# Patient Record
Sex: FEMALE | Race: BLACK OR AFRICAN AMERICAN | Employment: OTHER | URBAN - METROPOLITAN AREA
[De-identification: names, ages, dates, MRNs, and addresses within clinical notes are randomized per-mention and may not be internally consistent; named-entity substitution may affect disease eponyms.]

---

## 2021-01-01 ENCOUNTER — APPOINTMENT (OUTPATIENT)
Dept: GENERAL RADIOLOGY | Age: 71
DRG: 296 | End: 2021-01-01
Attending: INTERNAL MEDICINE
Payer: MEDICARE

## 2021-01-01 ENCOUNTER — APPOINTMENT (OUTPATIENT)
Dept: CT IMAGING | Age: 71
DRG: 296 | End: 2021-01-01
Attending: STUDENT IN AN ORGANIZED HEALTH CARE EDUCATION/TRAINING PROGRAM
Payer: MEDICARE

## 2021-01-01 ENCOUNTER — APPOINTMENT (OUTPATIENT)
Dept: GENERAL RADIOLOGY | Age: 71
DRG: 296 | End: 2021-01-01
Attending: STUDENT IN AN ORGANIZED HEALTH CARE EDUCATION/TRAINING PROGRAM
Payer: MEDICARE

## 2021-01-01 ENCOUNTER — APPOINTMENT (OUTPATIENT)
Dept: NON INVASIVE DIAGNOSTICS | Age: 71
DRG: 296 | End: 2021-01-01
Attending: STUDENT IN AN ORGANIZED HEALTH CARE EDUCATION/TRAINING PROGRAM
Payer: MEDICARE

## 2021-01-01 ENCOUNTER — HOSPITAL ENCOUNTER (INPATIENT)
Age: 71
LOS: 3 days | DRG: 296 | End: 2021-12-19
Attending: STUDENT IN AN ORGANIZED HEALTH CARE EDUCATION/TRAINING PROGRAM | Admitting: HOSPITALIST
Payer: MEDICARE

## 2021-01-01 VITALS
SYSTOLIC BLOOD PRESSURE: 83 MMHG | WEIGHT: 189.38 LBS | TEMPERATURE: 98.3 F | BODY MASS INDEX: 34.85 KG/M2 | OXYGEN SATURATION: 100 % | DIASTOLIC BLOOD PRESSURE: 67 MMHG | HEIGHT: 62 IN | RESPIRATION RATE: 7 BRPM

## 2021-01-01 DIAGNOSIS — I46.9 CARDIAC ARREST (HCC): Primary | ICD-10-CM

## 2021-01-01 DIAGNOSIS — J96.01 ACUTE RESPIRATORY FAILURE WITH HYPOXIA (HCC): ICD-10-CM

## 2021-01-01 DIAGNOSIS — R57.9 SHOCK (HCC): ICD-10-CM

## 2021-01-01 DIAGNOSIS — C50.919 MALIGNANT NEOPLASM OF FEMALE BREAST, UNSPECIFIED ESTROGEN RECEPTOR STATUS, UNSPECIFIED LATERALITY, UNSPECIFIED SITE OF BREAST (HCC): ICD-10-CM

## 2021-01-01 DIAGNOSIS — Z71.89 GOALS OF CARE, COUNSELING/DISCUSSION: ICD-10-CM

## 2021-01-01 LAB
ABO + RH BLD: NORMAL
ALBUMIN SERPL-MCNC: 2.3 G/DL (ref 3.4–5)
ALBUMIN SERPL-MCNC: 2.7 G/DL (ref 3.4–5)
ALBUMIN SERPL-MCNC: 2.8 G/DL (ref 3.4–5)
ALBUMIN SERPL-MCNC: 3.6 G/DL (ref 3.4–5)
ALBUMIN SERPL-MCNC: 4.1 G/DL (ref 3.4–5)
ALBUMIN/GLOB SERPL: 0.7 {RATIO} (ref 0.8–1.7)
ALP SERPL-CCNC: 135 U/L (ref 45–117)
ALT SERPL-CCNC: 89 U/L (ref 13–56)
ANION GAP BLD CALC-SCNC: 13 MMOL/L (ref 10–20)
ANION GAP BLD CALC-SCNC: 20 MMOL/L (ref 10–20)
ANION GAP BLD CALC-SCNC: 24 MMOL/L (ref 10–20)
ANION GAP SERPL CALC-SCNC: 18 MMOL/L (ref 3–18)
ANION GAP SERPL CALC-SCNC: 19 MMOL/L (ref 3–18)
ANION GAP SERPL CALC-SCNC: 24 MMOL/L (ref 3–18)
ANION GAP SERPL CALC-SCNC: 25 MMOL/L (ref 3–18)
ANION GAP SERPL CALC-SCNC: 32 MMOL/L (ref 3–18)
APPEARANCE UR: CLEAR
ARTERIAL PATENCY WRIST A: POSITIVE
AST SERPL-CCNC: 231 U/L (ref 10–38)
BACTERIA SPEC CULT: ABNORMAL
BACTERIA SPEC CULT: ABNORMAL
BACTERIA URNS QL MICRO: ABNORMAL /HPF
BASE DEFICIT BLD-SCNC: 13.9 MMOL/L
BASE DEFICIT BLD-SCNC: 2.3 MMOL/L
BASE DEFICIT BLD-SCNC: 3.2 MMOL/L
BASE DEFICIT BLD-SCNC: 5.9 MMOL/L
BASE DEFICIT BLD-SCNC: 7.1 MMOL/L
BASOPHILS # BLD: 0 K/UL (ref 0–0.1)
BASOPHILS NFR BLD: 0 % (ref 0–2)
BDY SITE: ABNORMAL
BILIRUB SERPL-MCNC: 1.3 MG/DL (ref 0.2–1)
BILIRUB UR QL: NEGATIVE
BLOOD GROUP ANTIBODIES SERPL: NORMAL
BNP SERPL-MCNC: 4672 PG/ML (ref 0–900)
BODY TEMPERATURE: 98.5
BODY TEMPERATURE: 98.6
BUN SERPL-MCNC: 13 MG/DL (ref 7–18)
BUN SERPL-MCNC: 17 MG/DL (ref 7–18)
BUN SERPL-MCNC: 26 MG/DL (ref 7–18)
BUN SERPL-MCNC: 28 MG/DL (ref 7–18)
BUN SERPL-MCNC: 40 MG/DL (ref 7–18)
BUN/CREAT SERPL: 7 (ref 12–20)
BUN/CREAT SERPL: 8 (ref 12–20)
BUN/CREAT SERPL: 9 (ref 12–20)
CA-I BLD-MCNC: 1.11 MMOL/L (ref 1.12–1.32)
CA-I BLD-MCNC: 1.2 MMOL/L (ref 1.12–1.32)
CA-I BLD-MCNC: 1.28 MMOL/L (ref 1.12–1.32)
CALCIUM SERPL-MCNC: 7.5 MG/DL (ref 8.5–10.1)
CALCIUM SERPL-MCNC: 8.6 MG/DL (ref 8.5–10.1)
CALCIUM SERPL-MCNC: 8.9 MG/DL (ref 8.5–10.1)
CALCIUM SERPL-MCNC: 9 MG/DL (ref 8.5–10.1)
CALCIUM SERPL-MCNC: 9.5 MG/DL (ref 8.5–10.1)
CALCULATED R AXIS, ECG10: -78 DEGREES
CALCULATED T AXIS, ECG11: 111 DEGREES
CAOX CRY URNS QL MICRO: ABNORMAL
CHLORIDE BLD-SCNC: 102 MMOL/L (ref 98–107)
CHLORIDE BLD-SCNC: 98 MMOL/L (ref 98–107)
CHLORIDE BLD-SCNC: 99 MMOL/L (ref 98–107)
CHLORIDE SERPL-SCNC: 101 MMOL/L (ref 100–111)
CHLORIDE SERPL-SCNC: 103 MMOL/L (ref 100–111)
CHLORIDE SERPL-SCNC: 91 MMOL/L (ref 100–111)
CHLORIDE SERPL-SCNC: 97 MMOL/L (ref 100–111)
CHLORIDE SERPL-SCNC: 99 MMOL/L (ref 100–111)
CK MB CFR SERPL CALC: 0.9 % (ref 0–4)
CK MB CFR SERPL CALC: 3.2 % (ref 0–4)
CK MB CFR SERPL CALC: 7.2 % (ref 0–4)
CK MB CFR SERPL CALC: 9.1 % (ref 0–4)
CK MB CFR SERPL CALC: ABNORMAL %
CK MB CFR SERPL CALC: ABNORMAL %
CK MB SERPL-MCNC: 105.3 NG/ML (ref 5–25)
CK MB SERPL-MCNC: 26.8 NG/ML (ref 5–25)
CK MB SERPL-MCNC: 30.8 NG/ML (ref 5–25)
CK MB SERPL-MCNC: 6.5 NG/ML (ref 5–25)
CK MB SERPL-MCNC: ABNORMAL NG/ML (ref 5–25)
CK MB SERPL-MCNC: ABNORMAL NG/ML (ref 5–25)
CK SERPL-CCNC: 1457 U/L (ref 26–192)
CK SERPL-CCNC: 293 U/L (ref 26–192)
CK SERPL-CCNC: 728 U/L (ref 26–192)
CK SERPL-CCNC: 975 U/L (ref 26–192)
CK SERPL-CCNC: ABNORMAL U/L
CK SERPL-CCNC: ABNORMAL U/L
CO2 BLD-SCNC: 21 MMOL/L (ref 19–24)
CO2 BLD-SCNC: 28 MMOL/L (ref 19–24)
CO2 BLD-SCNC: 31 MMOL/L (ref 19–24)
CO2 SERPL-SCNC: 14 MMOL/L (ref 21–32)
CO2 SERPL-SCNC: 18 MMOL/L (ref 21–32)
CO2 SERPL-SCNC: 19 MMOL/L (ref 21–32)
CO2 SERPL-SCNC: 24 MMOL/L (ref 21–32)
CO2 SERPL-SCNC: 25 MMOL/L (ref 21–32)
COLOR UR: ABNORMAL
COVID-19 RAPID TEST, COVR: NOT DETECTED
CREAT BLD-MCNC: 1.27 MG/DL (ref 0.6–1.3)
CREAT BLD-MCNC: 1.27 MG/DL (ref 0.6–1.3)
CREAT BLD-MCNC: 1.52 MG/DL (ref 0.6–1.3)
CREAT SERPL-MCNC: 1.55 MG/DL (ref 0.6–1.3)
CREAT SERPL-MCNC: 1.89 MG/DL (ref 0.6–1.3)
CREAT SERPL-MCNC: 2.94 MG/DL (ref 0.6–1.3)
CREAT SERPL-MCNC: 3.21 MG/DL (ref 0.6–1.3)
CREAT SERPL-MCNC: 5.59 MG/DL (ref 0.6–1.3)
DIAGNOSIS, 93000: NORMAL
DIFFERENTIAL METHOD BLD: ABNORMAL
ECHO AO ROOT DIAM: 2.7 CM
ECHO AO ROOT INDEX: 1.52 CM/M2
ECHO EST RA PRESSURE: 3 MMHG
ECHO LA DIAMETER INDEX: 1.91 CM/M2
ECHO LA DIAMETER: 3.4 CM
ECHO LA TO AORTIC ROOT RATIO: 1.26
ECHO LA VOL 2C: 63 ML (ref 22–52)
ECHO LA VOL 4C: 38 ML (ref 22–52)
ECHO LA VOL BP: 51 ML (ref 22–52)
ECHO LA VOL/BSA BIPLANE: 29 ML/M2 (ref 16–28)
ECHO LA VOLUME AREA LENGTH: 55 ML
ECHO LA VOLUME INDEX A2C: 35 ML/M2 (ref 16–28)
ECHO LA VOLUME INDEX A4C: 21 ML/M2 (ref 16–28)
ECHO LA VOLUME INDEX AREA LENGTH: 31 ML/M2
ECHO LV E' LATERAL VELOCITY: 5 CM/S
ECHO LV E' SEPTAL VELOCITY: 5 CM/S
ECHO LV EDV A2C: 153 ML
ECHO LV EDV A4C: 116 ML
ECHO LV EDV BP: 138 ML (ref 56–104)
ECHO LV EDV INDEX A4C: 65 ML/M2
ECHO LV EDV INDEX BP: 78 ML/M2
ECHO LV EDV NDEX A2C: 86 ML/M2
ECHO LV EJECTION FRACTION A2C: 17 %
ECHO LV EJECTION FRACTION A4C: 27 %
ECHO LV EJECTION FRACTION BIPLANE: 23 % (ref 55–100)
ECHO LV ESV A2C: 127 ML
ECHO LV ESV A4C: 85 ML
ECHO LV ESV BP: 106 ML (ref 19–49)
ECHO LV ESV INDEX A2C: 71 ML/M2
ECHO LV ESV INDEX A4C: 48 ML/M2
ECHO LV ESV INDEX BP: 60 ML/M2
ECHO LV FRACTIONAL SHORTENING: 9 % (ref 28–44)
ECHO LV INTERNAL DIMENSION DIASTOLE INDEX: 3.2 CM/M2
ECHO LV INTERNAL DIMENSION DIASTOLIC: 5.7 CM (ref 3.9–5.3)
ECHO LV INTERNAL DIMENSION SYSTOLIC INDEX: 2.92 CM/M2
ECHO LV INTERNAL DIMENSION SYSTOLIC: 5.2 CM
ECHO LV IVSD: 0.7 CM (ref 0.6–0.9)
ECHO LV MASS 2D: 144.3 G (ref 67–162)
ECHO LV MASS INDEX 2D: 81.1 G/M2 (ref 43–95)
ECHO LV POSTERIOR WALL DIASTOLIC: 0.7 CM (ref 0.6–0.9)
ECHO LV RELATIVE WALL THICKNESS RATIO: 0.25
ECHO LVOT AREA: 3.1 CM2
ECHO LVOT DIAM: 2 CM
ECHO MV E VELOCITY: 0.91 M/S
ECHO MV E/E' LATERAL: 18.2
ECHO MV E/E' RATIO (AVERAGED): 18.2
ECHO MV E/E' SEPTAL: 18.2
ECHO PULMONARY ARTERY END DIASTOLIC PRESSURE: 20 MMHG
ECHO PV REGURGITANT MAX VELOCITY: 2.2 M/S
ECHO RIGHT VENTRICULAR SYSTOLIC PRESSURE (RVSP): 33 MMHG
ECHO RV FREE WALL PEAK S': 9 CM/S
ECHO RV INTERNAL DIMENSION: 3.5 CM
ECHO RV TAPSE: 1.1 CM (ref 1.5–2)
ECHO TV REGURGITANT MAX VELOCITY: 2.73 M/S
ECHO TV REGURGITANT PEAK GRADIENT: 30 MMHG
EOSINOPHIL # BLD: 0 K/UL (ref 0–0.4)
EOSINOPHIL # BLD: 0.1 K/UL (ref 0–0.4)
EOSINOPHIL NFR BLD: 0 % (ref 0–5)
EOSINOPHIL NFR BLD: 2 % (ref 0–5)
EPITH CASTS URNS QL MICRO: ABNORMAL /LPF (ref 0–5)
ERYTHROCYTE [DISTWIDTH] IN BLOOD BY AUTOMATED COUNT: 19.4 % (ref 11.6–14.5)
ERYTHROCYTE [DISTWIDTH] IN BLOOD BY AUTOMATED COUNT: 19.4 % (ref 11.6–14.5)
ERYTHROCYTE [DISTWIDTH] IN BLOOD BY AUTOMATED COUNT: 19.6 % (ref 11.6–14.5)
ERYTHROCYTE [DISTWIDTH] IN BLOOD BY AUTOMATED COUNT: 20.3 % (ref 11.6–14.5)
EST. AVERAGE GLUCOSE BLD GHB EST-MCNC: 105 MG/DL
FIO2 ON VENT: 100 %
GAS FLOW.O2 O2 DELIVERY SYS: ABNORMAL L/MIN
GAS FLOW.O2 SETTING OXYMISER: 27 BPM
GAS FLOW.O2 SETTING OXYMISER: 30 BPM
GLOBULIN SER CALC-MCNC: 3.4 G/DL (ref 2–4)
GLUCOSE BLD STRIP.AUTO-MCNC: 120 MG/DL (ref 70–110)
GLUCOSE BLD STRIP.AUTO-MCNC: 126 MG/DL (ref 70–110)
GLUCOSE BLD STRIP.AUTO-MCNC: 131 MG/DL (ref 70–110)
GLUCOSE BLD STRIP.AUTO-MCNC: 134 MG/DL (ref 70–110)
GLUCOSE BLD STRIP.AUTO-MCNC: 134 MG/DL (ref 70–110)
GLUCOSE BLD STRIP.AUTO-MCNC: 140 MG/DL (ref 70–110)
GLUCOSE BLD STRIP.AUTO-MCNC: 145 MG/DL (ref 70–110)
GLUCOSE BLD STRIP.AUTO-MCNC: 218 MG/DL (ref 70–110)
GLUCOSE BLD STRIP.AUTO-MCNC: 29 MG/DL (ref 70–110)
GLUCOSE BLD STRIP.AUTO-MCNC: 30 MG/DL (ref 70–110)
GLUCOSE BLD STRIP.AUTO-MCNC: 36 MG/DL (ref 70–110)
GLUCOSE BLD STRIP.AUTO-MCNC: 40 MG/DL (ref 70–110)
GLUCOSE BLD STRIP.AUTO-MCNC: 43 MG/DL (ref 70–110)
GLUCOSE BLD STRIP.AUTO-MCNC: 54 MG/DL (ref 70–110)
GLUCOSE BLD STRIP.AUTO-MCNC: 57 MG/DL (ref 70–110)
GLUCOSE BLD STRIP.AUTO-MCNC: 57 MG/DL (ref 70–110)
GLUCOSE BLD STRIP.AUTO-MCNC: 61 MG/DL (ref 70–110)
GLUCOSE BLD STRIP.AUTO-MCNC: 65 MG/DL (ref 70–110)
GLUCOSE BLD STRIP.AUTO-MCNC: 66 MG/DL (ref 70–110)
GLUCOSE BLD STRIP.AUTO-MCNC: 68 MG/DL (ref 70–110)
GLUCOSE BLD STRIP.AUTO-MCNC: 71 MG/DL (ref 70–110)
GLUCOSE BLD STRIP.AUTO-MCNC: 94 MG/DL (ref 70–110)
GLUCOSE BLD STRIP.AUTO-MCNC: 95 MG/DL (ref 70–110)
GLUCOSE BLD-MCNC: 199 MG/DL (ref 65–100)
GLUCOSE BLD-MCNC: 238 MG/DL (ref 65–100)
GLUCOSE BLD-MCNC: 259 MG/DL (ref 65–100)
GLUCOSE SERPL-MCNC: 194 MG/DL (ref 74–99)
GLUCOSE SERPL-MCNC: 533 MG/DL (ref 74–99)
GLUCOSE SERPL-MCNC: 56 MG/DL (ref 74–99)
GLUCOSE SERPL-MCNC: 82 MG/DL (ref 74–99)
GLUCOSE SERPL-MCNC: 98 MG/DL (ref 74–99)
GLUCOSE UR STRIP.AUTO-MCNC: NEGATIVE MG/DL
GRAM STN SPEC: ABNORMAL
HBA1C MFR BLD: 5.3 % (ref 4.2–5.6)
HCO3 BLD-SCNC: 12.6 MMOL/L (ref 22–26)
HCO3 BLD-SCNC: 17.9 MMOL/L (ref 22–26)
HCO3 BLD-SCNC: 19.9 MMOL/L (ref 22–26)
HCO3 BLD-SCNC: 26.3 MMOL/L (ref 22–26)
HCO3 BLD-SCNC: 29.3 MMOL/L (ref 22–26)
HCT VFR BLD AUTO: 28 % (ref 35–45)
HCT VFR BLD AUTO: 32.9 % (ref 35–45)
HCT VFR BLD AUTO: 40.4 % (ref 35–45)
HCT VFR BLD AUTO: 46 % (ref 35–45)
HGB BLD-MCNC: 11.6 G/DL (ref 12–16)
HGB BLD-MCNC: 13.4 G/DL (ref 12–16)
HGB BLD-MCNC: 8.2 G/DL (ref 12–16)
HGB BLD-MCNC: 9.9 G/DL (ref 12–16)
HGB UR QL STRIP: NEGATIVE
IMM GRANULOCYTES # BLD AUTO: 0 K/UL
IMM GRANULOCYTES NFR BLD AUTO: 0 %
INR PPP: 2 (ref 0.8–1.2)
KETONES UR QL STRIP.AUTO: NEGATIVE MG/DL
LACTATE BLD-SCNC: 14.41 MMOL/L (ref 0.4–2)
LACTATE BLD-SCNC: 16.06 MMOL/L (ref 0.4–2)
LACTATE BLD-SCNC: 16.06 MMOL/L (ref 0.4–2)
LEUKOCYTE ESTERASE UR QL STRIP.AUTO: NEGATIVE
LYMPHOCYTES # BLD: 0.7 K/UL (ref 0.9–3.6)
LYMPHOCYTES # BLD: 0.8 K/UL (ref 0.9–3.6)
LYMPHOCYTES # BLD: 1.5 K/UL (ref 0.9–3.6)
LYMPHOCYTES # BLD: 3.5 K/UL (ref 0.9–3.6)
LYMPHOCYTES NFR BLD: 14 % (ref 21–52)
LYMPHOCYTES NFR BLD: 20 % (ref 21–52)
LYMPHOCYTES NFR BLD: 6 % (ref 21–52)
LYMPHOCYTES NFR BLD: 64 % (ref 21–52)
MAGNESIUM SERPL-MCNC: 1.8 MG/DL (ref 1.6–2.6)
MAGNESIUM SERPL-MCNC: 2.1 MG/DL (ref 1.6–2.6)
MAGNESIUM SERPL-MCNC: 2.2 MG/DL (ref 1.6–2.6)
MCH RBC QN AUTO: 29.6 PG (ref 24–34)
MCH RBC QN AUTO: 29.8 PG (ref 24–34)
MCH RBC QN AUTO: 30.7 PG (ref 24–34)
MCH RBC QN AUTO: 30.8 PG (ref 24–34)
MCHC RBC AUTO-ENTMCNC: 28.7 G/DL (ref 31–37)
MCHC RBC AUTO-ENTMCNC: 29.1 G/DL (ref 31–37)
MCHC RBC AUTO-ENTMCNC: 29.3 G/DL (ref 31–37)
MCHC RBC AUTO-ENTMCNC: 30.1 G/DL (ref 31–37)
MCV RBC AUTO: 101.8 FL (ref 78–100)
MCV RBC AUTO: 102.2 FL (ref 78–100)
MCV RBC AUTO: 103.9 FL (ref 78–100)
MCV RBC AUTO: 105.3 FL (ref 78–100)
METAMYELOCYTES NFR BLD MANUAL: 1 %
METAMYELOCYTES NFR BLD MANUAL: 1 %
METAMYELOCYTES NFR BLD MANUAL: 11 %
METAMYELOCYTES NFR BLD MANUAL: 2 %
MONOCYTES # BLD: 0.1 K/UL (ref 0.05–1.2)
MONOCYTES # BLD: 0.2 K/UL (ref 0.05–1.2)
MONOCYTES # BLD: 0.2 K/UL (ref 0.05–1.2)
MONOCYTES # BLD: 0.6 K/UL (ref 0.05–1.2)
MONOCYTES NFR BLD: 2 % (ref 3–10)
MONOCYTES NFR BLD: 3 % (ref 3–10)
MONOCYTES NFR BLD: 3 % (ref 3–10)
MONOCYTES NFR BLD: 5 % (ref 3–10)
MUCOUS THREADS URNS QL MICRO: ABNORMAL /LPF
MYELOCYTES NFR BLD MANUAL: 1 %
MYELOCYTES NFR BLD MANUAL: 2 %
NEUTS BAND NFR BLD MANUAL: 17 % (ref 0–5)
NEUTS BAND NFR BLD MANUAL: 24 % (ref 0–5)
NEUTS BAND NFR BLD MANUAL: 4 % (ref 0–5)
NEUTS BAND NFR BLD MANUAL: 42 % (ref 0–5)
NEUTS SEG # BLD: 1.7 K/UL (ref 1.8–8)
NEUTS SEG # BLD: 4.9 K/UL (ref 1.8–8)
NEUTS SEG # BLD: 5.9 K/UL (ref 1.8–8)
NEUTS SEG # BLD: 9.4 K/UL (ref 1.8–8)
NEUTS SEG NFR BLD: 26 % (ref 40–73)
NEUTS SEG NFR BLD: 39 % (ref 40–73)
NEUTS SEG NFR BLD: 52 % (ref 40–73)
NEUTS SEG NFR BLD: 59 % (ref 40–73)
NITRITE UR QL STRIP.AUTO: NEGATIVE
NRBC # BLD: 0 K/UL (ref 0–0.01)
NRBC # BLD: 0.03 K/UL (ref 0–0.01)
NRBC # BLD: 0.12 K/UL (ref 0–0.01)
NRBC # BLD: 0.26 K/UL (ref 0–0.01)
NRBC BLD-RTO: 0 PER 100 WBC
NRBC BLD-RTO: 0.5 PER 100 WBC
NRBC BLD-RTO: 2 PER 100 WBC
NRBC BLD-RTO: 2.1 PER 100 WBC
O2/TOTAL GAS SETTING VFR VENT: 100 %
O2/TOTAL GAS SETTING VFR VENT: 80 %
PCO2 BLD: 29 MMHG (ref 35–45)
PCO2 BLD: 30.8 MMHG (ref 35–45)
PCO2 BLD: 44.9 MMHG (ref 35–45)
PCO2 BLDV: 65.1 MMHG (ref 41–51)
PCO2 BLDV: 85.2 MMHG (ref 41–51)
PEEP RESPIRATORY: 5 CMH2O
PEEP RESPIRATORY: 5 CMH2O
PEEP RESPIRATORY: 6 CM[H2O]
PH BLD: 7.22 [PH] (ref 7.35–7.45)
PH BLD: 7.26 [PH] (ref 7.35–7.45)
PH BLD: 7.4 [PH] (ref 7.35–7.45)
PH BLDV: 7.14 [PH] (ref 7.32–7.42)
PH BLDV: 7.21 [PH] (ref 7.32–7.42)
PH UR STRIP: 5.5 [PH] (ref 5–8)
PHOSPHATE SERPL-MCNC: 2 MG/DL (ref 2.5–4.9)
PHOSPHATE SERPL-MCNC: 3.6 MG/DL (ref 2.5–4.9)
PHOSPHATE SERPL-MCNC: 4 MG/DL (ref 2.5–4.9)
PHOSPHATE SERPL-MCNC: 7.6 MG/DL (ref 2.5–4.9)
PIP ISTAT,IPIP: 21
PIP ISTAT,IPIP: 23
PLATELET # BLD AUTO: 112 K/UL (ref 135–420)
PLATELET # BLD AUTO: 164 K/UL (ref 135–420)
PLATELET # BLD AUTO: 177 K/UL (ref 135–420)
PLATELET # BLD AUTO: 240 K/UL (ref 135–420)
PLATELET COMMENTS,PCOM: ABNORMAL
PLATELET COMMENTS,PCOM: ABNORMAL
PMV BLD AUTO: 10 FL (ref 9.2–11.8)
PMV BLD AUTO: 10.3 FL (ref 9.2–11.8)
PMV BLD AUTO: 10.4 FL (ref 9.2–11.8)
PMV BLD AUTO: 10.4 FL (ref 9.2–11.8)
PO2 BLD: 144 MMHG (ref 80–100)
PO2 BLD: 350 MMHG (ref 80–100)
PO2 BLD: 378 MMHG (ref 80–100)
PO2 BLDV: 39 MMHG (ref 25–40)
PO2 BLDV: 42 MMHG (ref 25–40)
POTASSIUM BLD-SCNC: 2.4 MMOL/L (ref 3.5–5.1)
POTASSIUM BLD-SCNC: 3.1 MMOL/L (ref 3.5–5.1)
POTASSIUM BLD-SCNC: 3.4 MMOL/L (ref 3.5–5.1)
POTASSIUM SERPL-SCNC: 2.5 MMOL/L (ref 3.5–5.5)
POTASSIUM SERPL-SCNC: 2.6 MMOL/L (ref 3.5–5.5)
POTASSIUM SERPL-SCNC: 2.7 MMOL/L (ref 3.5–5.5)
POTASSIUM SERPL-SCNC: 3.1 MMOL/L (ref 3.5–5.5)
POTASSIUM SERPL-SCNC: 4.6 MMOL/L (ref 3.5–5.5)
POTASSIUM SERPL-SCNC: 5 MMOL/L (ref 3.5–5.5)
PROT SERPL-MCNC: 5.7 G/DL (ref 6.4–8.2)
PROT UR STRIP-MCNC: ABNORMAL MG/DL
PROTHROMBIN TIME: 21.4 SEC (ref 11.5–15.2)
Q-T INTERVAL, ECG07: 562 MS
QRS DURATION, ECG06: 136 MS
QTC CALCULATION (BEZET), ECG08: 575 MS
RBC # BLD AUTO: 2.66 M/UL (ref 4.2–5.3)
RBC # BLD AUTO: 3.22 M/UL (ref 4.2–5.3)
RBC # BLD AUTO: 3.89 M/UL (ref 4.2–5.3)
RBC # BLD AUTO: 4.52 M/UL (ref 4.2–5.3)
RBC #/AREA URNS HPF: ABNORMAL /HPF (ref 0–5)
RBC MORPH BLD: ABNORMAL
SAO2 % BLD: 100 %
SAO2 % BLD: 55 %
SAO2 % BLD: 65 %
SAO2 % BLD: 99.3 % (ref 92–97)
SAO2 % BLD: 99.9 % (ref 92–97)
SERVICE CMNT-IMP: ABNORMAL
SODIUM BLD-SCNC: 143 MMOL/L (ref 136–145)
SODIUM BLD-SCNC: 144 MMOL/L (ref 136–145)
SODIUM BLD-SCNC: 145 MMOL/L (ref 136–145)
SODIUM SERPL-SCNC: 134 MMOL/L (ref 136–145)
SODIUM SERPL-SCNC: 142 MMOL/L (ref 136–145)
SODIUM SERPL-SCNC: 143 MMOL/L (ref 136–145)
SODIUM SERPL-SCNC: 144 MMOL/L (ref 136–145)
SODIUM SERPL-SCNC: 146 MMOL/L (ref 136–145)
SOURCE, COVRS: NORMAL
SP GR UR REFRACTOMETRY: 1.02 (ref 1–1.03)
SPECIMEN EXP DATE BLD: NORMAL
SPECIMEN SITE: ABNORMAL
SPECIMEN TYPE: ABNORMAL
SPECIMEN TYPE: ABNORMAL
TROPONIN-HIGH SENSITIVITY: 6834 NG/L (ref 0–54)
TROPONIN-HIGH SENSITIVITY: ABNORMAL NG/L (ref 0–54)
UROBILINOGEN UR QL STRIP.AUTO: 1 EU/DL (ref 0.2–1)
VANCOMYCIN SERPL-MCNC: 25.3 UG/ML (ref 5–40)
VENTILATION MODE VENT: ABNORMAL
VENTILATION MODE VENT: ABNORMAL
VENTRICULAR RATE, ECG03: 63 BPM
VT SETTING VENT: 400 ML
WBC # BLD AUTO: 12.4 K/UL (ref 4.6–13.2)
WBC # BLD AUTO: 5.5 K/UL (ref 4.6–13.2)
WBC # BLD AUTO: 6 K/UL (ref 4.6–13.2)
WBC # BLD AUTO: 7.7 K/UL (ref 4.6–13.2)
WBC URNS QL MICRO: ABNORMAL /HPF (ref 0–5)

## 2021-01-01 PROCEDURE — 74011000250 HC RX REV CODE- 250: Performed by: FAMILY MEDICINE

## 2021-01-01 PROCEDURE — 82962 GLUCOSE BLOOD TEST: CPT

## 2021-01-01 PROCEDURE — 99285 EMERGENCY DEPT VISIT HI MDM: CPT

## 2021-01-01 PROCEDURE — 71045 X-RAY EXAM CHEST 1 VIEW: CPT

## 2021-01-01 PROCEDURE — 74011250636 HC RX REV CODE- 250/636: Performed by: STUDENT IN AN ORGANIZED HEALTH CARE EDUCATION/TRAINING PROGRAM

## 2021-01-01 PROCEDURE — 96368 THER/DIAG CONCURRENT INF: CPT

## 2021-01-01 PROCEDURE — 74011250636 HC RX REV CODE- 250/636: Performed by: FAMILY MEDICINE

## 2021-01-01 PROCEDURE — 80069 RENAL FUNCTION PANEL: CPT

## 2021-01-01 PROCEDURE — 36415 COLL VENOUS BLD VENIPUNCTURE: CPT

## 2021-01-01 PROCEDURE — 84132 ASSAY OF SERUM POTASSIUM: CPT

## 2021-01-01 PROCEDURE — 77030010538 HC BG FLEXSEAL FMS BMS -A

## 2021-01-01 PROCEDURE — 94762 N-INVAS EAR/PLS OXIMTRY CONT: CPT

## 2021-01-01 PROCEDURE — 74011250636 HC RX REV CODE- 250/636: Performed by: INTERNAL MEDICINE

## 2021-01-01 PROCEDURE — 99223 1ST HOSP IP/OBS HIGH 75: CPT | Performed by: NURSE PRACTITIONER

## 2021-01-01 PROCEDURE — 74011000250 HC RX REV CODE- 250: Performed by: INTERNAL MEDICINE

## 2021-01-01 PROCEDURE — 74011000258 HC RX REV CODE- 258: Performed by: STUDENT IN AN ORGANIZED HEALTH CARE EDUCATION/TRAINING PROGRAM

## 2021-01-01 PROCEDURE — P9047 ALBUMIN (HUMAN), 25%, 50ML: HCPCS | Performed by: FAMILY MEDICINE

## 2021-01-01 PROCEDURE — 85025 COMPLETE CBC W/AUTO DIFF WBC: CPT

## 2021-01-01 PROCEDURE — 93306 TTE W/DOPPLER COMPLETE: CPT

## 2021-01-01 PROCEDURE — C9113 INJ PANTOPRAZOLE SODIUM, VIA: HCPCS | Performed by: INTERNAL MEDICINE

## 2021-01-01 PROCEDURE — 36600 WITHDRAWAL OF ARTERIAL BLOOD: CPT

## 2021-01-01 PROCEDURE — 74011000258 HC RX REV CODE- 258: Performed by: FAMILY MEDICINE

## 2021-01-01 PROCEDURE — 83880 ASSAY OF NATRIURETIC PEPTIDE: CPT

## 2021-01-01 PROCEDURE — 77010033678 HC OXYGEN DAILY

## 2021-01-01 PROCEDURE — 87635 SARS-COV-2 COVID-19 AMP PRB: CPT

## 2021-01-01 PROCEDURE — 51702 INSERT TEMP BLADDER CATH: CPT

## 2021-01-01 PROCEDURE — 74011250637 HC RX REV CODE- 250/637: Performed by: NURSE PRACTITIONER

## 2021-01-01 PROCEDURE — 82040 ASSAY OF SERUM ALBUMIN: CPT

## 2021-01-01 PROCEDURE — 84484 ASSAY OF TROPONIN QUANT: CPT

## 2021-01-01 PROCEDURE — 84295 ASSAY OF SERUM SODIUM: CPT

## 2021-01-01 PROCEDURE — 94002 VENT MGMT INPAT INIT DAY: CPT

## 2021-01-01 PROCEDURE — 74011000258 HC RX REV CODE- 258: Performed by: INTERNAL MEDICINE

## 2021-01-01 PROCEDURE — 74011000250 HC RX REV CODE- 250: Performed by: STUDENT IN AN ORGANIZED HEALTH CARE EDUCATION/TRAINING PROGRAM

## 2021-01-01 PROCEDURE — 84100 ASSAY OF PHOSPHORUS: CPT

## 2021-01-01 PROCEDURE — 87040 BLOOD CULTURE FOR BACTERIA: CPT

## 2021-01-01 PROCEDURE — 65610000006 HC RM INTENSIVE CARE

## 2021-01-01 PROCEDURE — 71275 CT ANGIOGRAPHY CHEST: CPT

## 2021-01-01 PROCEDURE — 80053 COMPREHEN METABOLIC PANEL: CPT

## 2021-01-01 PROCEDURE — 93005 ELECTROCARDIOGRAM TRACING: CPT

## 2021-01-01 PROCEDURE — 82553 CREATINE MB FRACTION: CPT

## 2021-01-01 PROCEDURE — 70450 CT HEAD/BRAIN W/O DYE: CPT

## 2021-01-01 PROCEDURE — 92950 HEART/LUNG RESUSCITATION CPR: CPT

## 2021-01-01 PROCEDURE — 83036 HEMOGLOBIN GLYCOSYLATED A1C: CPT

## 2021-01-01 PROCEDURE — 80202 ASSAY OF VANCOMYCIN: CPT

## 2021-01-01 PROCEDURE — 82803 BLOOD GASES ANY COMBINATION: CPT

## 2021-01-01 PROCEDURE — 5A1945Z RESPIRATORY VENTILATION, 24-96 CONSECUTIVE HOURS: ICD-10-PCS | Performed by: HOSPITALIST

## 2021-01-01 PROCEDURE — 95816 EEG AWAKE AND DROWSY: CPT | Performed by: INTERNAL MEDICINE

## 2021-01-01 PROCEDURE — 76450000000

## 2021-01-01 PROCEDURE — 86901 BLOOD TYPING SEROLOGIC RH(D): CPT

## 2021-01-01 PROCEDURE — 81001 URINALYSIS AUTO W/SCOPE: CPT

## 2021-01-01 PROCEDURE — 83735 ASSAY OF MAGNESIUM: CPT

## 2021-01-01 PROCEDURE — 94003 VENT MGMT INPAT SUBQ DAY: CPT

## 2021-01-01 PROCEDURE — 87070 CULTURE OTHR SPECIMN AEROBIC: CPT

## 2021-01-01 PROCEDURE — 85610 PROTHROMBIN TIME: CPT

## 2021-01-01 PROCEDURE — 74011250636 HC RX REV CODE- 250/636

## 2021-01-01 PROCEDURE — 74177 CT ABD & PELVIS W/CONTRAST: CPT

## 2021-01-01 PROCEDURE — 82550 ASSAY OF CK (CPK): CPT

## 2021-01-01 PROCEDURE — 96365 THER/PROPH/DIAG IV INF INIT: CPT

## 2021-01-01 PROCEDURE — 74011250637 HC RX REV CODE- 250/637: Performed by: FAMILY MEDICINE

## 2021-01-01 PROCEDURE — 0BH18EZ INSERTION OF ENDOTRACHEAL AIRWAY INTO TRACHEA, VIA NATURAL OR ARTIFICIAL OPENING ENDOSCOPIC: ICD-10-PCS | Performed by: HOSPITALIST

## 2021-01-01 PROCEDURE — 74011000636 HC RX REV CODE- 636: Performed by: STUDENT IN AN ORGANIZED HEALTH CARE EDUCATION/TRAINING PROGRAM

## 2021-01-01 PROCEDURE — 77030020291 HC FLEXSEAL FMS BMS -C

## 2021-01-01 RX ORDER — SODIUM BICARBONATE 1 MEQ/ML
SYRINGE (ML) INTRAVENOUS
Status: COMPLETED | OUTPATIENT
Start: 2021-01-01 | End: 2021-01-01

## 2021-01-01 RX ORDER — EPINEPHRINE 0.1 MG/ML
INJECTION INTRACARDIAC; INTRAVENOUS
Status: COMPLETED | OUTPATIENT
Start: 2021-01-01 | End: 2021-01-01

## 2021-01-01 RX ORDER — ALBUMIN HUMAN 250 G/1000ML
25 SOLUTION INTRAVENOUS EVERY 6 HOURS
Status: DISCONTINUED | OUTPATIENT
Start: 2021-01-01 | End: 2021-01-01 | Stop reason: HOSPADM

## 2021-01-01 RX ORDER — DEXTROSE MONOHYDRATE 100 MG/ML
50 INJECTION, SOLUTION INTRAVENOUS CONTINUOUS
Status: DISCONTINUED | OUTPATIENT
Start: 2021-01-01 | End: 2021-01-01 | Stop reason: HOSPADM

## 2021-01-01 RX ORDER — LEVOFLOXACIN 5 MG/ML
750 INJECTION, SOLUTION INTRAVENOUS EVERY 24 HOURS
Status: DISCONTINUED | OUTPATIENT
Start: 2021-01-01 | End: 2021-01-01

## 2021-01-01 RX ORDER — MORPHINE SULFATE 2 MG/ML
2 INJECTION, SOLUTION INTRAMUSCULAR; INTRAVENOUS
Status: DISCONTINUED | OUTPATIENT
Start: 2021-01-01 | End: 2021-01-01 | Stop reason: HOSPADM

## 2021-01-01 RX ORDER — DOPAMINE HYDROCHLORIDE 160 MG/100ML
INJECTION, SOLUTION INTRAVENOUS
Status: COMPLETED
Start: 2021-01-01 | End: 2021-01-01

## 2021-01-01 RX ORDER — NOREPINEPHRINE BITARTRATE/D5W 8 MG/250ML
.5-16 PLASTIC BAG, INJECTION (ML) INTRAVENOUS
Status: DISCONTINUED | OUTPATIENT
Start: 2021-01-01 | End: 2021-01-01 | Stop reason: SDUPTHER

## 2021-01-01 RX ORDER — MORPHINE SULFATE 5 MG/ML
INJECTION, SOLUTION INTRAVENOUS
Status: DISCONTINUED | OUTPATIENT
Start: 2021-01-01 | End: 2021-01-01 | Stop reason: HOSPADM

## 2021-01-01 RX ORDER — DEXTROSE 50 % IN WATER (D50W) INTRAVENOUS SYRINGE
Status: DISPENSED
Start: 2021-01-01 | End: 2021-01-01

## 2021-01-01 RX ORDER — SODIUM CHLORIDE 9 MG/ML
25 INJECTION, SOLUTION INTRAVENOUS CONTINUOUS
Status: DISCONTINUED | OUTPATIENT
Start: 2021-01-01 | End: 2021-01-01 | Stop reason: HOSPADM

## 2021-01-01 RX ORDER — MAGNESIUM SULFATE 100 %
4 CRYSTALS MISCELLANEOUS AS NEEDED
Status: DISCONTINUED | OUTPATIENT
Start: 2021-01-01 | End: 2021-01-01 | Stop reason: HOSPADM

## 2021-01-01 RX ORDER — SODIUM CHLORIDE 0.9 % (FLUSH) 0.9 %
5-10 SYRINGE (ML) INJECTION AS NEEDED
Status: DISCONTINUED | OUTPATIENT
Start: 2021-01-01 | End: 2021-01-01 | Stop reason: HOSPADM

## 2021-01-01 RX ORDER — DOPAMINE HYDROCHLORIDE 160 MG/100ML
0-20 INJECTION, SOLUTION INTRAVENOUS
Status: DISCONTINUED | OUTPATIENT
Start: 2021-01-01 | End: 2021-01-01 | Stop reason: HOSPADM

## 2021-01-01 RX ORDER — NOREPINEPHRINE BITARTRATE/D5W 8 MG/250ML
PLASTIC BAG, INJECTION (ML) INTRAVENOUS
Status: DISPENSED
Start: 2021-01-01 | End: 2021-01-01

## 2021-01-01 RX ORDER — LORAZEPAM 2 MG/ML
1 INJECTION INTRAMUSCULAR
Status: DISCONTINUED | OUTPATIENT
Start: 2021-01-01 | End: 2021-01-01 | Stop reason: HOSPADM

## 2021-01-01 RX ORDER — POTASSIUM CHLORIDE 7.45 MG/ML
10 INJECTION INTRAVENOUS
Status: COMPLETED | OUTPATIENT
Start: 2021-01-01 | End: 2021-01-01

## 2021-01-01 RX ORDER — NOREPINEPHRINE BITARTRATE 1 MG/ML
8 INJECTION, SOLUTION INTRAVENOUS
Status: DISCONTINUED | OUTPATIENT
Start: 2021-01-01 | End: 2021-01-01 | Stop reason: SDUPTHER

## 2021-01-01 RX ORDER — LORAZEPAM 2 MG/ML
2 INJECTION INTRAMUSCULAR ONCE
Status: DISCONTINUED | OUTPATIENT
Start: 2021-01-01 | End: 2021-01-01 | Stop reason: HOSPADM

## 2021-01-01 RX ORDER — GLYCOPYRROLATE 0.2 MG/ML
0.1 INJECTION INTRAMUSCULAR; INTRAVENOUS ONCE
Status: DISCONTINUED | OUTPATIENT
Start: 2021-01-01 | End: 2021-01-01 | Stop reason: HOSPADM

## 2021-01-01 RX ORDER — NOREPINEPHRINE BITARTRATE/D5W 8 MG/250ML
.5-3 PLASTIC BAG, INJECTION (ML) INTRAVENOUS
Status: DISCONTINUED | OUTPATIENT
Start: 2021-01-01 | End: 2021-01-01 | Stop reason: HOSPADM

## 2021-01-01 RX ORDER — MAGNESIUM SULFATE 100 %
4 CRYSTALS MISCELLANEOUS AS NEEDED
Status: DISCONTINUED | OUTPATIENT
Start: 2021-01-01 | End: 2021-01-01

## 2021-01-01 RX ORDER — DEXTROSE 50 % IN WATER (D50W) INTRAVENOUS SYRINGE
25-50 AS NEEDED
Status: DISCONTINUED | OUTPATIENT
Start: 2021-01-01 | End: 2021-01-01 | Stop reason: HOSPADM

## 2021-01-01 RX ORDER — ENOXAPARIN SODIUM 100 MG/ML
40 INJECTION SUBCUTANEOUS EVERY 24 HOURS
Status: DISCONTINUED | OUTPATIENT
Start: 2021-01-01 | End: 2021-01-01

## 2021-01-01 RX ORDER — ACETAMINOPHEN 650 MG/1
650 SUPPOSITORY RECTAL
Status: DISCONTINUED | OUTPATIENT
Start: 2021-01-01 | End: 2021-01-01 | Stop reason: HOSPADM

## 2021-01-01 RX ORDER — DEXTROSE MONOHYDRATE AND SODIUM CHLORIDE 5; .9 G/100ML; G/100ML
25 INJECTION, SOLUTION INTRAVENOUS CONTINUOUS
Status: DISCONTINUED | OUTPATIENT
Start: 2021-01-01 | End: 2021-01-01

## 2021-01-01 RX ORDER — MIDAZOLAM IN 0.9 % SOD.CHLORID 1 MG/ML
1-2 PLASTIC BAG, INJECTION (ML) INTRAVENOUS
Status: DISCONTINUED | OUTPATIENT
Start: 2021-01-01 | End: 2021-01-01

## 2021-01-01 RX ORDER — FENTANYL CITRATE 50 UG/ML
50 INJECTION, SOLUTION INTRAMUSCULAR; INTRAVENOUS
Status: DISCONTINUED | OUTPATIENT
Start: 2021-01-01 | End: 2021-01-01 | Stop reason: HOSPADM

## 2021-01-01 RX ORDER — CALCIUM CHLORIDE INJECTION 100 MG/ML
INJECTION, SOLUTION INTRAVENOUS
Status: COMPLETED | OUTPATIENT
Start: 2021-01-01 | End: 2021-01-01

## 2021-01-01 RX ORDER — ENOXAPARIN SODIUM 100 MG/ML
30 INJECTION SUBCUTANEOUS EVERY 24 HOURS
Status: DISCONTINUED | OUTPATIENT
Start: 2021-01-01 | End: 2021-01-01 | Stop reason: HOSPADM

## 2021-01-01 RX ORDER — VANCOMYCIN 2 GRAM/500 ML IN 0.9 % SODIUM CHLORIDE INTRAVENOUS
2000 ONCE
Status: COMPLETED | OUTPATIENT
Start: 2021-01-01 | End: 2021-01-01

## 2021-01-01 RX ORDER — NOREPINEPHRINE BIT/0.9 % NACL 8 MG/250ML
.5-3 INFUSION BOTTLE (ML) INTRAVENOUS
Status: DISCONTINUED | OUTPATIENT
Start: 2021-01-01 | End: 2021-01-01 | Stop reason: CLARIF

## 2021-01-01 RX ORDER — DEXTROSE 50 % IN WATER (D50W) INTRAVENOUS SYRINGE
25-50 AS NEEDED
Status: DISCONTINUED | OUTPATIENT
Start: 2021-01-01 | End: 2021-01-01

## 2021-01-01 RX ADMIN — PIPERACILLIN AND TAZOBACTAM 3.38 G: 3; .375 INJECTION, POWDER, LYOPHILIZED, FOR SOLUTION INTRAVENOUS at 20:39

## 2021-01-01 RX ADMIN — SODIUM BICARBONATE: 84 INJECTION, SOLUTION INTRAVENOUS at 00:47

## 2021-01-01 RX ADMIN — SODIUM CHLORIDE 1000 ML: 9 INJECTION, SOLUTION INTRAVENOUS at 12:31

## 2021-01-01 RX ADMIN — DEXTROSE MONOHYDRATE 25 G: 25 INJECTION, SOLUTION INTRAVENOUS at 07:04

## 2021-01-01 RX ADMIN — ALBUMIN (HUMAN) 25 G: 0.25 INJECTION, SOLUTION INTRAVENOUS at 23:50

## 2021-01-01 RX ADMIN — POTASSIUM CHLORIDE 10 MEQ: 7.46 INJECTION, SOLUTION INTRAVENOUS at 03:50

## 2021-01-01 RX ADMIN — MINERAL OIL, PETROLATUM: 425; 568 OINTMENT OPHTHALMIC at 08:02

## 2021-01-01 RX ADMIN — SODIUM CHLORIDE 40 MG: 9 INJECTION, SOLUTION INTRAMUSCULAR; INTRAVENOUS; SUBCUTANEOUS at 08:02

## 2021-01-01 RX ADMIN — NOREPINEPHRINE BITARTRATE 30 MCG/MIN: 1 INJECTION, SOLUTION, CONCENTRATE INTRAVENOUS at 02:44

## 2021-01-01 RX ADMIN — PIPERACILLIN AND TAZOBACTAM 3.38 G: 3; .375 INJECTION, POWDER, LYOPHILIZED, FOR SOLUTION INTRAVENOUS at 12:51

## 2021-01-01 RX ADMIN — ALBUMIN (HUMAN) 25 G: 0.25 INJECTION, SOLUTION INTRAVENOUS at 20:41

## 2021-01-01 RX ADMIN — PIPERACILLIN AND TAZOBACTAM 3.38 G: 3; .375 INJECTION, POWDER, LYOPHILIZED, FOR SOLUTION INTRAVENOUS at 23:50

## 2021-01-01 RX ADMIN — DOPAMINE HYDROCHLORIDE 5 MCG/KG/MIN: 160 INJECTION, SOLUTION INTRAVENOUS at 09:17

## 2021-01-01 RX ADMIN — SODIUM BICARBONATE 50 MEQ: 84 INJECTION INTRAVENOUS at 10:53

## 2021-01-01 RX ADMIN — DEXTROSE MONOHYDRATE 25 G: 25 INJECTION, SOLUTION INTRAVENOUS at 04:00

## 2021-01-01 RX ADMIN — VANCOMYCIN HYDROCHLORIDE 2000 MG: 10 INJECTION, POWDER, LYOPHILIZED, FOR SOLUTION INTRAVENOUS at 17:44

## 2021-01-01 RX ADMIN — POTASSIUM CHLORIDE 10 MEQ: 7.46 INJECTION, SOLUTION INTRAVENOUS at 08:45

## 2021-01-01 RX ADMIN — MINERAL OIL, PETROLATUM: 425; 568 OINTMENT OPHTHALMIC at 03:12

## 2021-01-01 RX ADMIN — PIPERACILLIN AND TAZOBACTAM 4.5 G: 4; .5 INJECTION, POWDER, LYOPHILIZED, FOR SOLUTION INTRAVENOUS; PARENTERAL at 12:30

## 2021-01-01 RX ADMIN — MINERAL OIL, PETROLATUM: 425; 568 OINTMENT OPHTHALMIC at 08:50

## 2021-01-01 RX ADMIN — ALBUMIN (HUMAN) 25 G: 0.25 INJECTION, SOLUTION INTRAVENOUS at 17:03

## 2021-01-01 RX ADMIN — POTASSIUM CHLORIDE 10 MEQ: 7.46 INJECTION, SOLUTION INTRAVENOUS at 02:50

## 2021-01-01 RX ADMIN — PIPERACILLIN AND TAZOBACTAM 3.38 G: 3; .375 INJECTION, POWDER, LYOPHILIZED, FOR SOLUTION INTRAVENOUS at 00:30

## 2021-01-01 RX ADMIN — POTASSIUM CHLORIDE 10 MEQ: 7.46 INJECTION, SOLUTION INTRAVENOUS at 04:51

## 2021-01-01 RX ADMIN — SODIUM BICARBONATE: 84 INJECTION, SOLUTION INTRAVENOUS at 17:39

## 2021-01-01 RX ADMIN — POTASSIUM CHLORIDE 10 MEQ: 7.46 INJECTION, SOLUTION INTRAVENOUS at 06:02

## 2021-01-01 RX ADMIN — ALBUMIN (HUMAN) 25 G: 0.25 INJECTION, SOLUTION INTRAVENOUS at 12:05

## 2021-01-01 RX ADMIN — SODIUM BICARBONATE: 84 INJECTION, SOLUTION INTRAVENOUS at 19:47

## 2021-01-01 RX ADMIN — NOREPINEPHRINE BITARTRATE 14 MCG/MIN: 1 INJECTION, SOLUTION, CONCENTRATE INTRAVENOUS at 01:23

## 2021-01-01 RX ADMIN — MORPHINE SULFATE 2 MG: 2 INJECTION, SOLUTION INTRAMUSCULAR; INTRAVENOUS at 10:31

## 2021-01-01 RX ADMIN — DEXTROSE MONOHYDRATE AND SODIUM CHLORIDE 25 ML/HR: 5; .9 INJECTION, SOLUTION INTRAVENOUS at 04:08

## 2021-01-01 RX ADMIN — SODIUM CHLORIDE 40 MG: 9 INJECTION, SOLUTION INTRAMUSCULAR; INTRAVENOUS; SUBCUTANEOUS at 08:43

## 2021-01-01 RX ADMIN — NOREPINEPHRINE BITARTRATE 30 MCG/MIN: 1 INJECTION, SOLUTION, CONCENTRATE INTRAVENOUS at 21:46

## 2021-01-01 RX ADMIN — DOPAMINE HYDROCHLORIDE 5 MCG/KG/MIN: 160 INJECTION, SOLUTION INTRAVENOUS at 10:00

## 2021-01-01 RX ADMIN — ENOXAPARIN SODIUM 30 MG: 100 INJECTION SUBCUTANEOUS at 17:03

## 2021-01-01 RX ADMIN — NOREPINEPHRINE BITARTRATE 20 MCG/MIN: 1 INJECTION, SOLUTION, CONCENTRATE INTRAVENOUS at 19:48

## 2021-01-01 RX ADMIN — MINERAL OIL, PETROLATUM: 425; 568 OINTMENT OPHTHALMIC at 21:48

## 2021-01-01 RX ADMIN — ALBUMIN (HUMAN) 25 G: 0.25 INJECTION, SOLUTION INTRAVENOUS at 00:31

## 2021-01-01 RX ADMIN — ALBUMIN (HUMAN) 25 G: 0.25 INJECTION, SOLUTION INTRAVENOUS at 05:27

## 2021-01-01 RX ADMIN — SODIUM CHLORIDE 25 ML/HR: 9 INJECTION, SOLUTION INTRAVENOUS at 20:59

## 2021-01-01 RX ADMIN — NOREPINEPHRINE BITARTRATE 30 MCG/MIN: 1 INJECTION, SOLUTION, CONCENTRATE INTRAVENOUS at 07:46

## 2021-01-01 RX ADMIN — DEXTROSE MONOHYDRATE 25 G: 25 INJECTION, SOLUTION INTRAVENOUS at 17:45

## 2021-01-01 RX ADMIN — EPINEPHRINE 1 MG: 0.1 INJECTION, SOLUTION ENDOTRACHEAL; INTRACARDIAC; INTRAVENOUS at 10:53

## 2021-01-01 RX ADMIN — DEXTROSE MONOHYDRATE 25 G: 25 INJECTION, SOLUTION INTRAVENOUS at 17:03

## 2021-01-01 RX ADMIN — DEXTROSE MONOHYDRATE 25 G: 25 INJECTION, SOLUTION INTRAVENOUS at 05:28

## 2021-01-01 RX ADMIN — MIDAZOLAM 1 MG/HR: 5 INJECTION, SOLUTION INTRAMUSCULAR; INTRAVENOUS at 16:43

## 2021-01-01 RX ADMIN — DEXTROSE MONOHYDRATE 25 G: 25 INJECTION, SOLUTION INTRAVENOUS at 23:22

## 2021-01-01 RX ADMIN — EPINEPHRINE 1 MG: 0.1 INJECTION, SOLUTION ENDOTRACHEAL; INTRACARDIAC; INTRAVENOUS at 10:47

## 2021-01-01 RX ADMIN — DEXTROSE MONOHYDRATE 25 G: 25 INJECTION, SOLUTION INTRAVENOUS at 12:22

## 2021-01-01 RX ADMIN — ALBUMIN (HUMAN) 25 G: 0.25 INJECTION, SOLUTION INTRAVENOUS at 05:29

## 2021-01-01 RX ADMIN — NOREPINEPHRINE BITARTRATE 21 MCG/MIN: 1 INJECTION, SOLUTION, CONCENTRATE INTRAVENOUS at 11:14

## 2021-01-01 RX ADMIN — PIPERACILLIN AND TAZOBACTAM 3.38 G: 3; .375 INJECTION, POWDER, LYOPHILIZED, FOR SOLUTION INTRAVENOUS at 01:28

## 2021-01-01 RX ADMIN — ENOXAPARIN SODIUM 30 MG: 100 INJECTION SUBCUTANEOUS at 16:35

## 2021-01-01 RX ADMIN — NOREPINEPHRINE BITARTRATE 6 MCG/MIN: 1 INJECTION, SOLUTION, CONCENTRATE INTRAVENOUS at 16:48

## 2021-01-01 RX ADMIN — ACETAMINOPHEN 650 MG: 650 SUPPOSITORY RECTAL at 18:15

## 2021-01-01 RX ADMIN — NOREPINEPHRINE BITARTRATE 8 MCG/MIN: 1 INJECTION, SOLUTION, CONCENTRATE INTRAVENOUS at 12:26

## 2021-01-01 RX ADMIN — ALBUMIN (HUMAN) 25 G: 0.25 INJECTION, SOLUTION INTRAVENOUS at 18:24

## 2021-01-01 RX ADMIN — PIPERACILLIN AND TAZOBACTAM 3.38 G: 3; .375 INJECTION, POWDER, LYOPHILIZED, FOR SOLUTION INTRAVENOUS at 12:04

## 2021-01-01 RX ADMIN — CALCIUM CHLORIDE 1 G: 100 INJECTION, SOLUTION INTRAVENOUS at 10:46

## 2021-01-01 RX ADMIN — ALBUMIN (HUMAN) 25 G: 0.25 INJECTION, SOLUTION INTRAVENOUS at 11:07

## 2021-01-01 RX ADMIN — SODIUM CHLORIDE 40 MG: 9 INJECTION, SOLUTION INTRAMUSCULAR; INTRAVENOUS; SUBCUTANEOUS at 08:50

## 2021-01-01 RX ADMIN — ALBUMIN (HUMAN) 25 G: 0.25 INJECTION, SOLUTION INTRAVENOUS at 05:23

## 2021-01-01 RX ADMIN — LORAZEPAM 1 MG: 2 INJECTION INTRAMUSCULAR; INTRAVENOUS at 10:28

## 2021-01-01 RX ADMIN — PIPERACILLIN AND TAZOBACTAM 3.38 G: 3; .375 INJECTION, POWDER, LYOPHILIZED, FOR SOLUTION INTRAVENOUS at 05:23

## 2021-01-01 RX ADMIN — NOREPINEPHRINE BITARTRATE 15 MCG/MIN: 1 INJECTION, SOLUTION, CONCENTRATE INTRAVENOUS at 12:04

## 2021-01-01 RX ADMIN — EPINEPHRINE 1 MG: 0.1 INJECTION, SOLUTION ENDOTRACHEAL; INTRACARDIAC; INTRAVENOUS at 10:44

## 2021-01-01 RX ADMIN — ALBUMIN (HUMAN) 25 G: 0.25 INJECTION, SOLUTION INTRAVENOUS at 01:25

## 2021-01-01 RX ADMIN — GLUCAGON HYDROCHLORIDE 1 MG: KIT at 05:19

## 2021-01-01 RX ADMIN — NOREPINEPHRINE BITARTRATE 30 MCG/MIN: 1 INJECTION, SOLUTION, CONCENTRATE INTRAVENOUS at 16:36

## 2021-01-01 RX ADMIN — FENTANYL CITRATE 50 MCG: 50 INJECTION, SOLUTION INTRAMUSCULAR; INTRAVENOUS at 16:44

## 2021-01-01 RX ADMIN — VANCOMYCIN HYDROCHLORIDE 500 MG: 500 INJECTION, POWDER, LYOPHILIZED, FOR SOLUTION INTRAVENOUS at 17:23

## 2021-01-01 RX ADMIN — DEXTROSE MONOHYDRATE 50 ML/HR: 10 INJECTION, SOLUTION INTRAVENOUS at 19:46

## 2021-01-01 RX ADMIN — LEVOFLOXACIN 750 MG: 5 INJECTION, SOLUTION INTRAVENOUS at 12:30

## 2021-01-01 RX ADMIN — POTASSIUM CHLORIDE 10 MEQ: 7.46 INJECTION, SOLUTION INTRAVENOUS at 07:39

## 2021-01-01 RX ADMIN — NOREPINEPHRINE BITARTRATE 20 MCG/MIN: 1 INJECTION, SOLUTION, CONCENTRATE INTRAVENOUS at 03:11

## 2021-01-01 RX ADMIN — IOPAMIDOL 100 ML: 755 INJECTION, SOLUTION INTRAVENOUS at 14:03

## 2021-01-01 RX ADMIN — POTASSIUM CHLORIDE 10 MEQ: 7.46 INJECTION, SOLUTION INTRAVENOUS at 16:44

## 2021-12-16 PROBLEM — E87.6 HYPOKALEMIA: Status: ACTIVE | Noted: 2021-01-01

## 2021-12-16 PROBLEM — R57.9 SHOCK (HCC): Status: ACTIVE | Noted: 2021-01-01

## 2021-12-16 PROBLEM — I46.9 CARDIAC ARREST (HCC): Status: ACTIVE | Noted: 2021-01-01

## 2021-12-16 PROBLEM — R79.89 LFT ELEVATION: Status: ACTIVE | Noted: 2021-01-01

## 2021-12-16 PROBLEM — R73.9 ACUTE HYPERGLYCEMIA: Status: ACTIVE | Noted: 2021-01-01

## 2021-12-16 PROBLEM — J96.90 RESPIRATORY FAILURE (HCC): Status: ACTIVE | Noted: 2021-01-01

## 2021-12-16 PROBLEM — N17.9 AKI (ACUTE KIDNEY INJURY) (HCC): Status: ACTIVE | Noted: 2021-01-01

## 2021-12-16 PROBLEM — C50.919 BREAST CANCER (HCC): Status: ACTIVE | Noted: 2021-01-01

## 2021-12-16 NOTE — ROUTINE PROCESS
TRANSFER - OUT REPORT:    Verbal report given to Lakshmi on Romina Ringer  being transferred to ICU for routine progression of care       Report consisted of patients Situation, Background, Assessment and   Recommendations(SBAR). Information from the following report(s) SBAR, ED Summary, Intake/Output, MAR, Recent Results and Alarm Parameters  was reviewed with the receiving nurse. Lines:   Venous Access Device 12/16/21 Upper chest (subclavicular area, right (Active)       Peripheral IV 12/16/21 Left Antecubital (Active)       Peripheral IV 12/16/21 Right Hand (Active)        Opportunity for questions and clarification was provided.       Patient transported with:   Monitor  O2 @  VENT liters  Registered Nurse  Tech

## 2021-12-16 NOTE — ED TRIAGE NOTES
Patient BIB via EMS d/t witness cardiac arrest at home    EMS reports PEA upon arrival; compressions started with [place]    Total of Epi IVP given en route via EMS    EMS reported Vfib noted; initiated 3 defib    ROSC achieved en route;    Cardiac arrest returned with PEA    300 Amio, 50 Bicarb, Narcan given    FSBS [id number] mg/dL    EMS intubated patient with 7.0 ETT; capno 55    Ongoing compressions via [place] upon ED arrival    PMHx breast and lung CA    EMS reported family initiated hospice, palliative care    Patient FULL code at this time

## 2021-12-16 NOTE — ED PROVIDER NOTES
EMERGENCY DEPARTMENT HISTORY AND PHYSICAL EXAM    11:26 AM    Date: 12/16/2021  Patient Name: Makayla Hennessy    History of Presenting Illness     Chief Complaint   Patient presents with    Cardiac arrest       History Provided By: Patient  Location/Duration/Severity/Modifying factors   HPI  Makayla Hennessy is a 73 y.o. female with a past medical history of hypertension, asthma, paroxysmal A. fib on Eliquis, metastatic breast cancer presenting for cardiac arrest.  Patient was reportedly found unresponsive by her family members today, EMS was called. When EMS arrived on scene they found her to be in PEA cardiac arrest.  ACLS was followed including 7 rounds of epinephrine, amiodarone, bicarbonate. She was intubated in the field with a good end-tidal CO2. She was placed on a [place] device. ROSC was achieved in the field, to begin transport to the hospital and she lost pulses again, approximately 15 minutes prior to arrival.  They gave additional epinephrine. History limited by acuity of situation.     PCP: Inna Yuan MD    Current Facility-Administered Medications   Medication Dose Route Frequency Provider Last Rate Last Admin    sodium chloride (NS) flush 5-10 mL  5-10 mL IntraVENous PRN Teresita Caputo MD        sodium chloride 0.9 % bolus infusion 313 mL  313 mL IntraVENous ONCE Teresita Caputo MD   Held at 12/16/21 1231    NOREPINephrine (LEVOPHED) 8 mg in 0.9% NS 250ml infusion  0.5-16 mcg/min IntraVENous TITRATE Teresita Caputo MD 15 mL/hr at 12/16/21 1654 8 mcg/min at 12/16/21 1654    vancomycin (VANCOCIN) 2000 mg in  ml infusion  2,000 mg IntraVENous Jamar Mera MD        potassium chloride 10 mEq in 100 ml IVPB  10 mEq IntraVENous NOW Teresita Caputo  mL/hr at 12/16/21 1644 10 mEq at 12/16/21 1644    insulin regular (NOVOLIN R, HUMULIN R) 100 Units in 0.9% sodium chloride 100 mL infusion  0-50 Units/hr IntraVENous TITRATE Tammy Carpenter MD        glucose chewable tablet 16 g  4 Tablet Oral PRN Luis Raya MD        glucagon [facility]) injection 1 mg  1 mg IntraMUSCular PRN Luis Raya MD        dextrose (D50W) injection syrg 12.5-25 g  25-50 mL IntraVENous PRN Luis Raya MD        0.9% sodium chloride infusion  25 mL/hr IntraVENous CONTINUOUS Luis Raya MD        potassium bicarb-citric acid (EFFER-K) tablet 40 mEq  40 mEq Oral NOW MD Aldair Mckeon Pacer ON 12/17/2021] pantoprazole (PROTONIX) 40 mg in 0.9% sodium chloride 10 mL injection  40 mg IntraVENous DAILY Luis Raya MD        fentaNYL citrate (PF) injection 50 mcg  50 mcg IntraVENous Q4H PRN Luis Raya MD   50 mcg at 12/16/21 1644    midazolam in normal saline (VERSED) 1 mg/mL infusion  1-2 mg/hr IntraVENous TITRATE Luis Raya MD 1 mL/hr at 12/16/21 1643 1 mg/hr at 12/16/21 1643    sodium bicarbonate (8.4%) 150 mEq in dextrose 5% 1,000 mL infusion   IntraVENous CONTINUOUS Luis Raya MD        piperacillin-tazobactam (ZOSYN) 3.375 g in 0.9% sodium chloride (MBP/ADV) 100 mL MBP  3.375 g IntraVENous Q6H Luis Raya MD        enoxaparin (LOVENOX) injection 40 mg  40 mg SubCUTAneous Q24H Luis Raya MD           Past History     Past Medical History:  No past medical history on file. Past Surgical History:  No past surgical history on file. Family History:  No family history on file. Social History:  Social History     Tobacco Use    Smoking status: Not on file    Smokeless tobacco: Not on file   Substance Use Topics    Alcohol use: Not on file    Drug use: Not on file       Allergies: Allergies   Allergen Reactions    Latex Itching    Codeine Itching    Lisinopril Cough    Venlafaxine Other (comments)     confusion       I reviewed and confirmed the above information with patient and updated as necessary.     Review of Systems     Review of Systems   Unable to perform ROS: Intubated       Physical Exam     Visit Vitals  /84   Pulse 76   Temp (!) 94.3 °F (34.6 °C)   Resp 15   Ht 5' 2\" (1.575 m)   Wt 77.1 kg (170 lb)   SpO2 100%   BMI 31.09 kg/m²       Physical Exam  Vitals and nursing note reviewed. Constitutional:       Comments: Adult female, unesponsive, joni device in place, pulseless. HENT:      Mouth/Throat:      Comments: Vomit in the mouth, ET in place  Eyes:      Comments: Pupils nonreactive   Neck:      Vascular: No carotid bruit. Cardiovascular:      Comments: Pulseless  Pulmonary:      Comments: No spontaneous respiratory effort  Abdominal:      General: There is distension. Palpations: Abdomen is soft. Tenderness: There is no abdominal tenderness. Musculoskeletal:         General: No signs of injury. Skin:     General: Skin is warm. Neurological:      General: No focal deficit present. Mental Status: She is alert. Comments: Nonreactive to pain, GCS 3          Diagnostic Study Results     Labs -  Recent Results (from the past 24 hour(s))   BLOOD GAS,CHEM8,LACTIC ACID POC    Collection Time: 12/16/21 11:07 AM   Result Value Ref Range    Calcium, ionized (POC) 1.20 1. 12 - 1.32 mmol/L    Base deficit (POC) 2.3 mmol/L    HCO3 (POC) 29.3 (H) 22 - 26 MMOL/L    CO2, POC 31 (H) 19 - 24 MMOL/L    O2 SAT 55 %    Sample source VENOUS BLOOD      Performed by Bia Keys     Sodium (POC) 145 136 - 145 mmol/L    Potassium (POC) 3.4 (L) 3.5 - 5.1 mmol/L    Glucose (POC) 259 (H) 65 - 100 mg/dL    Creatinine (POC) 1.27 0.6 - 1.3 mg/dL    Lactic Acid (POC) 16.06 (HH) 0.40 - 2.00 mmol/L    Chloride (POC) 102 98 - 107 mmol/L    Anion gap, POC 13 10 - 20      GFRAA, POC 51 (L) >60 ml/min/1.73m2    GFRNA, POC 42 (L) >60 ml/min/1.73m2    pH, venous (POC) 7.14 (LL) 7.32 - 7.42      pCO2, venous (POC) 85.2 (H) 41 - 51 MMHG    pO2, venous (POC) 39 25 - 40 mmHg   EKG, 12 LEAD, INITIAL    Collection Time: 12/16/21 11:07 AM   Result Value Ref Range Ventricular Rate 63 BPM    QRS Duration 136 ms    Q-T Interval 562 ms    QTC Calculation (Bezet) 575 ms    Calculated R Axis -78 degrees    Calculated T Axis 111 degrees    Diagnosis       Wide QRS rhythm  Left axis deviation  Right bundle branch block  Inferior infarct , age undetermined  Abnormal ECG  When compared with ECG of 29-OCT-2009 09:01,  Wide QRS rhythm has replaced Sinus rhythm  Confirmed by Carrington Romero MD, -- ([id number]) on 12/16/2021 3:36:54 PM     CBC WITH AUTOMATED DIFF    Collection Time: 12/16/21 11:09 AM   Result Value Ref Range    WBC 5.5 4.6 - 13.2 K/uL    RBC 3.89 (L) 4.20 - 5.30 M/uL    HGB 11.6 (L) 12.0 - 16.0 g/dL    HCT 40.4 35.0 - 45.0 %    .9 (H) 78.0 - 100.0 FL    MCH 29.8 24.0 - 34.0 PG    MCHC 28.7 (L) 31.0 - 37.0 g/dL    RDW 19.4 (H) 11.6 - 14.5 %    PLATELET 088 779 - 461 K/uL    MPV 10.4 9.2 - 11.8 FL    NRBC 0.5 (H) 0  WBC    ABSOLUTE NRBC 0.03 (H) 0.00 - 0.01 K/uL    NEUTROPHILS 26 (L) 40 - 73 %    BAND NEUTROPHILS 4 0 - 5 %    LYMPHOCYTES 64 (H) 21 - 52 %    MONOCYTES 2 (L) 3 - 10 %    EOSINOPHILS 2 0 - 5 %    BASOPHILS 0 0 - 2 %    METAMYELOCYTES 1 (H) 0 %    MYELOCYTES 1 (H) 0 %    IMMATURE GRANULOCYTES 0 %    ABS. NEUTROPHILS 1.7 (L) 1.8 - 8.0 K/UL    ABS. LYMPHOCYTES 3.5 0.9 - 3.6 K/UL    ABS. MONOCYTES 0.1 0.05 - 1.2 K/UL    ABS. EOSINOPHILS 0.1 0.0 - 0.4 K/UL    ABS. BASOPHILS 0.0 0.0 - 0.1 K/UL    ABS. IMM.  GRANS. 0.0 K/UL    DF MANUAL      RBC COMMENTS MACROCYTOSIS  2+        RBC COMMENTS POLYCHROMASIA  2+        RBC COMMENTS HYPOCHROMIA  1+        RBC COMMENTS SCHISTOCYTES  2+        RBC COMMENTS SPHEROCYTES  FEW       TYPE & SCREEN    Collection Time: 12/16/21 11:09 AM   Result Value Ref Range    Crossmatch Expiration 12/19/2021,2359     ABO/Rh(D) Lorrie Cogan POSITIVE     Antibody screen NEG    BLOOD GAS,CHEM8,LACTIC ACID POC    Collection Time: 12/16/21 11:23 AM   Result Value Ref Range    pH (POC) 7.26 (L) 7.35 - 7.45      pCO2 (POC) 44.9 35.0 - 45.0 MMHG    pO2 (POC) 378 (H) 80 - 100 MMHG    Calcium, ionized (POC) 1.28 1.12 - 1.32 mmol/L    Base deficit (POC) 7.1 mmol/L    HCO3 (POC) 19.9 (L) 22 - 26 MMOL/L    CO2, POC 21 19 - 24 MMOL/L    O2  %    Sample source ARTERIAL      SITE RIGHT RADIAL      STEPHIE'S TEST Positive      Device: ADULT VENT      FIO2 100 %    Tidal volume 400      PEEP/CPAP 6      Performed by Nilsa Hoffman RESP     Sodium (POC) 143 136 - 145 mmol/L    Potassium (POC) 3.1 (L) 3.5 - 5.1 mmol/L    Glucose (POC) 238 (H) 65 - 100 mg/dL    Creatinine (POC) 1.27 0.6 - 1.3 mg/dL    Lactic Acid (POC) 16.06 (HH) 0.40 - 2.00 mmol/L    Chloride (POC) 99 98 - 107 mmol/L    Anion gap, POC 24 (H) 10 - 20      GFRAA, POC 51 (L) >60 ml/min/1.73m2    GFRNA, POC 42 (L) >60 ml/min/1.73m2   URINALYSIS W/ RFLX MICROSCOPIC    Collection Time: 12/16/21 12:50 PM   Result Value Ref Range    Color DARK YELLOW      Appearance CLEAR      Specific gravity 1.017 1.005 - 1.030      pH (UA) 5.5 5.0 - 8.0      Protein TRACE (A) NEG mg/dL    Glucose Negative NEG mg/dL    Ketone Negative NEG mg/dL    Bilirubin Negative NEG      Blood Negative NEG      Urobilinogen 1.0 0.2 - 1.0 EU/dL    Nitrites Negative NEG      Leukocyte Esterase Negative NEG     TROPONIN-HIGH SENSITIVITY    Collection Time: 12/16/21 12:50 PM   Result Value Ref Range    Troponin-High Sensitivity 6,834 (HH) 0 - 54 ng/L   CKMB PROFILE    Collection Time: 12/16/21 12:50 PM   Result Value Ref Range     (H) 26 - 192 U/L    CK - MB 26.8 (H) <3.6 ng/ml    CK-MB Index 9.1 (H) 0.0 - 4.0 %   METABOLIC PANEL, COMPREHENSIVE    Collection Time: 12/16/21 12:50 PM   Result Value Ref Range    Sodium 134 (L) 136 - 145 mmol/L    Potassium 2.5 (LL) 3.5 - 5.5 mmol/L    Chloride 91 (L) 100 - 111 mmol/L    CO2 25 21 - 32 mmol/L    Anion gap 18 3.0 - 18 mmol/L    Glucose 533 (HH) 74 - 99 mg/dL    BUN 13 7.0 - 18 MG/DL    Creatinine 1.55 (H) 0.6 - 1.3 MG/DL    BUN/Creatinine ratio 8 (L) 12 - 20      GFR est AA 40 (L) >60 ml/min/1.73m2    GFR est non-AA 33 (L) >60 ml/min/1.73m2    Calcium 9.0 8.5 - 10.1 MG/DL    Bilirubin, total 1.3 (H) 0.2 - 1.0 MG/DL    ALT (SGPT) 89 (H) 13 - 56 U/L    AST (SGOT) 231 (H) 10 - 38 U/L    Alk.  phosphatase 135 (H) 45 - 117 U/L    Protein, total 5.7 (L) 6.4 - 8.2 g/dL    Albumin 2.3 (L) 3.4 - 5.0 g/dL    Globulin 3.4 2.0 - 4.0 g/dL    A-G Ratio 0.7 (L) 0.8 - 1.7     URINE MICROSCOPIC ONLY    Collection Time: 12/16/21 12:50 PM   Result Value Ref Range    WBC 0 to 3 0 - 5 /hpf    RBC 0 to 3 0 - 5 /hpf    Epithelial cells 1+ 0 - 5 /lpf    Bacteria FEW (A) NEG /hpf    Mucus FEW (A) NEG /lpf    CA Oxalate crystals FEW (A) NEG     NT-PRO BNP    Collection Time: 12/16/21 12:50 PM   Result Value Ref Range    NT pro-BNP 4,672 (H) 0 - 900 PG/ML   MAGNESIUM    Collection Time: 12/16/21 12:50 PM   Result Value Ref Range    Magnesium 2.1 1.6 - 2.6 mg/dL   ECHO ADULT COMPLETE    Collection Time: 12/16/21  3:25 PM   Result Value Ref Range    IVSd 0.7 0.6 - 0.9 cm    LVIDd 5.7 (A) 3.9 - 5.3 cm    LVIDs 5.2 cm    LVOT Diameter 2.0 cm    LVPWd 0.7 0.6 - 0.9 cm    EF BP 23 (A) 55 - 100 %    LV Ejection Fraction A2C 17 %    LV Ejection Fraction A4C 27 %    LV EDV A2C 153 mL    LV EDV A4C 116 mL    LV EDV  (A) 56 - 104 mL    LV ESV A2C 127 mL    LV ESV A4C 85 mL    LV ESV  (A) 19 - 49 mL    RVIDd 3.5 cm    RVSP 33 mmHg    RV Free Wall Peak S' 9 cm/s    LA Diameter 3.4 cm    LA Volume A/L 55 mL    LA Volume 2C 63 (A) 22 - 52 mL    LA Volume 4C 38 22 - 52 mL    LA Volume BP 51 22 - 52 mL    Est. RA Pressure 3 mmHg    LV E' Lateral Velocity 5 cm/s    LV E' Septal Velocity 5 cm/s    Pulmonary Artery EDP 20 mmHg    RI Max Velocity 2.2 m/s    TAPSE 1.1 (A) 1.5 - 2.0 cm    TR Peak Gradient 30 mmHg    TR Max Velocity 2.73 m/s    Aortic Root 2.7 cm    Fractional Shortening 2D 9 28 - 44 %    LV ESV Index BP 60 mL/m2    LV ESV Index A4C 48 mL/m2    LV EDV Index A4C 65 mL/m2    LV ESV Index A2C 71 mL/m2    LV EDV Index A2C 86 mL/m2    LVIDd Index 3.20 cm/m2    LVIDs Index 2.92 cm/m2    LV RWT Ratio 0.25     LV Mass 2D 144.3 67 - 162 g    LV Mass 2D Index 81.1 43 - 95 g/m2    LA Volume Index A/L 31 mL/m2    LVOT Area 3.1 cm2    LA Volume Index 2C 35 (A) 16 - 28 mL/m2    LA Volume Index 4C 21 16 - 28 mL/m2    LA Size Index 1.91 cm/m2    LA/AO Root Ratio 1.26     Ao Root Index 1.52 cm/m2    LV EDV Index BP 78 mL/m2    MV E Velocity 0.91 m/s    E/E' Ratio (Averaged) 18.20     E/E' Lateral 18.20     E/E' Septal 18.20     LA Volume Index BP 29 (A) 16 - 28 ml/m2   GLUCOSE, POC    Collection Time: 12/16/21  3:29 PM   Result Value Ref Range    Glucose (POC) 218 (H) 70 - 110 mg/dL   BLOOD GAS,CHEM8,LACTIC ACID POC    Collection Time: 12/16/21  4:27 PM   Result Value Ref Range    Calcium, ionized (POC) 1.11 (L) 1.12 - 1.32 mmol/L    Base deficit (POC) 3.2 mmol/L    HCO3 (POC) 26.3 (H) 22 - 26 MMOL/L    CO2, POC 28 (H) 19 - 24 MMOL/L    O2 SAT 65 %    Sample source VENOUS BLOOD      Performed by Tricia Weems     Sodium (POC) 144 136 - 145 mmol/L    Potassium (POC) 2.4 (LL) 3.5 - 5.1 mmol/L    Glucose (POC) 199 (H) 65 - 100 mg/dL    Creatinine (POC) 1.52 (H) 0.6 - 1.3 mg/dL    Lactic Acid (POC) 14.41 (HH) 0.40 - 2.00 mmol/L    Chloride (POC) 98 98 - 107 mmol/L    Anion gap, POC 20 10 - 20      GFRAA, POC 41 (L) >60 ml/min/1.73m2    GFRNA, POC 34 (L) >60 ml/min/1.73m2    pH, venous (POC) 7.21 (L) 7.32 - 7.42      pCO2, venous (POC) 65.1 (H) 41 - 51 MMHG    pO2, venous (POC) 42 (H) 25 - 40 mmHg   PROTHROMBIN TIME + INR    Collection Time: 12/16/21  4:30 PM   Result Value Ref Range    Prothrombin time 21.4 (H) 11.5 - 15.2 sec    INR 2.0 (H) 0.8 - 1.2     CBC WITH AUTOMATED DIFF    Collection Time: 12/16/21  4:30 PM   Result Value Ref Range    WBC 7.7 4.6 - 13.2 K/uL    RBC 4.52 4.20 - 5.30 M/uL    HGB 13.4 12.0 - 16.0 g/dL    HCT 46.0 (H) 35.0 - 45.0 %    .8 (H) 78.0 - 100.0 FL    MCH 29.6 24.0 - 34.0 PG    MCHC 29.1 (L) 31.0 - 37.0 g/dL RDW 19.6 (H) 11.6 - 14.5 %    PLATELET 310 202 - 560 K/uL    MPV 10.0 9.2 - 11.8 FL    NRBC 0.0 0  WBC    ABSOLUTE NRBC 0.00 0.00 - 0.01 K/uL    NEUTROPHILS PENDING %    LYMPHOCYTES PENDING %    MONOCYTES PENDING %    EOSINOPHILS PENDING %    BASOPHILS PENDING %    IMMATURE GRANULOCYTES PENDING %    ABS. NEUTROPHILS PENDING K/UL    ABS. LYMPHOCYTES PENDING K/UL    ABS. MONOCYTES PENDING K/UL    ABS. EOSINOPHILS PENDING K/UL    ABS. BASOPHILS PENDING K/UL    ABS. IMM. GRANS. PENDING K/UL    DF PENDING          Radiologic Studies -   CT HEAD WO CONT   Final Result         1. No acute intracranial abnormality demonstrated. 2. Subcortical and periventricular white matter low-attenuation as can be seen   with sequela of chronic ischemic microvascular change   3. Nonspecific paranasal mucosal thickening/fluid      CT ABD PELV W CONT   Final Result      1. Heterogeneous parenchymal enhancement which may be accentuated by contrast   bolus timing. These findings may also be accentuated by low cardiac output   state/congestive hepatopathy. 2. Nasogastric tube appropriately positioned within the stomach. No evidence of   bowel obstruction or free intraperitoneal gas. 3. No hydronephrosis. Urinary bladder decompressed with [place] catheter in situ. CTA CHEST W OR W WO CONT   Final Result   1. No evidence of pulmonary embolism. 2. Multifocal areas of alveolar consolidation, greatest across the right upper,   middle, and lower lobes as described above, potentially pneumonitis related to   aspiration given the distribution. 3. Small bilateral pleural effusions without pneumothorax. 4. Multiple bilateral rib fractures as described in detail above. XR CHEST PORT   Final Result         1. Support devices in appropriate position. 2. Asymmetric, diffuse areas of pulmonary opacification, potentially edema   and-or pneumonia.        XR CHEST PORT    (Results Pending)   XR CHEST PORT    (Results Pending)   XR CHEST PORT    (Results Pending)           Medical Decision Making   I am the first provider for this patient. I reviewed the vital signs, available nursing notes, past medical history, past surgical history, family history and social history. Vital Signs-Reviewed the patient's vital signs. EKG: Nondiagnostic    Records Reviewed: Nursing Notes, Old Medical Records, Previous electrocardiograms and Previous Radiology Studies (Time of Review: 11:26 AM)    Provider Notes (Medical Decision Making):   MDM  81-year-old female here with cardiac arrest, potentially due to her underlying cancer, PE, ACS, metabolic derangement, sepsis, infection, others    ED Course: Progress Notes, Reevaluation, and Consults:  Patient arrives to the emergency department unresponsive, GCS of 3, intubated with ET tube with chest fractions ongoing with Vandana Miguelina device  In the field patient received 7 rounds of epinephrine, amiodarone, bicarbonate    Patient was transferred over to our hospital stretcher, Vandana Miguelina device was paused, patient was pulseless without any spontaneous respirations and CPR was continued via ACLS protocol. She received multiple rounds of epinephrine, bicarbonate, calcium. ET tube placement was confirmed via visualization with fiberoptic blade. ROSC was achieved after about 26 minutes of downtime since she last obtained ROSC in the ambulance, total amount of downtime was roughly 40 minutes. Her pupils are fixed and dilated, unclear what her underlying neurologic status will be. Had a long discussion with about 10 of the patient's family members including her sister who is the POA. Reviewed recent notes that say that the patient would not like advanced techniques to prolong her life, although they wish her to stay full code at this time. Patient had her port accessed, epinephrine drip, Levophed were titrated per protocol.     Patient remained hemodynamically stable, however unfortunately has not required any sedation. Patient was transferred to CT for imaging. I discussed with hospitalist and intensivist who agree with the    Plan to admit, they will follow up on the imaging and arrival additional tests they would like. Procedures    Critical Care Time: 80 minutes critical care time for cardiac arrest    Diagnosis     Clinical Impression:   1. Cardiac arrest ([facility] [address]    2. Acute respiratory failure with hypoxia (HCC)    3. Malignant neoplasm of female breast, unspecified estrogen receptor status, unspecified laterality, unspecified site of breast ([facility] [address]    4. Shock ([facility] [address]        Disposition: Admit to ICU    Follow-up Information    None          Patient's Medications    No medications on file       Geovanni Scott MD   Emergency Medicine   December 16, 2021, 11:26 AM     This note is dictated utilizing Dragon voice recognition software. Unfortunately this leads to occasional typographical errors using the voice recognition. I apologize in advance if the situation occurs. If questions occur please do not hesitate to contact me directly.     Arnav Schreiber MD

## 2021-12-16 NOTE — H&P
History & Physical    Patient: Thanh Henderson MRN: [id number]  CSN: [id number]    YOB: 1950  Age: 73 y.o. Sex: female      DOA: 12/16/2021  Primary Care Provider:  Carmencita Sahni MD      Assessment/Plan     Patient Active Problem List   Diagnosis Code    Shock [facility]) R57.9    Cardiac arrest ([facility] [address] I46.9    Respiratory failure ([facility] [address] J96.90    Breast cancer ([facility] [address] C50.919    SAUNDRA (acute kidney injury) ([facility] [address] N17.9    LFT elevation R79.89    Acute hyperglycemia R73.9    Hypokalemia E87.6    PAF (paroxysmal atrial fibrillation) ([facility] [address] I48.0    Asthma J45.909       Admit to ICU    CRITICAL CARE PLAN    Resp - See vent orders, VAP bundle. HOB>30 degrees. Bronchodilators. Follow sputum cx. CTA chest with no PE, showed Multifocal areas of alveolar consolidation, greatest across the right upper, middle, and lower lobes as described above, potentially pneumonitis related to aspiration given the distribution. Small bilateral pleural effusions without pneumothorax. Multiple bilateral rib fractures    ID - Follow up blood cx. Aspiration pneumonia   ANTIBIOTICS vancomycin, zosyn. CVS - Monitor HD. Wean pressors, levo for MAP>65. Shock - likely from cardiopulmonary arrest  Cardiopulmonary arrest  On levophed, anastasiia as tolerated  PAF - on eliquis at home, now on hold  Cardiomyopathy -   Follow echo    Heme/onc - Follow H&H, plts. Metastatic breast ca, stage 4  Was referred to hospice. Renal - Trend BUN, Cr, follow I/O, ocasio in place. Check and replace Mg, K, phos. Renal insufficiency    Endocrine -    Hyperglycemia - on insulin drip    Neuro/ Pain/ Sedation -   S/p cardiopulmonary arrest.  CT head with no acute findings  Concern for anoxic brain injury      GI - NPO for now. Elevated liver function secondary to shock liver.     Prophylaxis - DVT: lovenox, GI: protonix    [id number]  50 minutes of critical care time spent in the direct evaluation and treatment of this high risk patient. The reason for providing this level of medical care for this critically ill patient was due a critical illness that impaired one or more vital organ systems such that there was a high probability of imminent or life threatening deterioration in the patients condition. This care involved high complexity decision making to assess, manipulate, and support vital system functions, to treat this degreee vital organ system failure and to prevent further life threatening deterioration of the patients condition. Estimated length of stay : TBD    CC: cardiopulmonary arrest.       HPI:     Jose E Medina is a 73 y.o. female with metastatic breast cancer stage IV, hypertension, asthma, PAF on Eliquis at home presents to ER status post cardiac arrest.  Patient is orally intubated unresponsive history obtained from chart review. She was found unresponsive by family member EMS was called she was found to be in PEA arrest.  ACLS protocol followed including  epinephrine, amiodarone and bicarbonate. She was intubated on field. She was being brought to the hospital, she lost pulse again before arrival to hospital requiring additional epinephrine. When she arrived to the ER she was found pulseless without any spontaneous respiration. CPR again initiated with ACLS protocol she received multiple rounds of epinephrine, bicarb, calcium. Per ER note ROSC achieved after about 26 minutes of downtime. Approximate total downtime roughly 40 minutes. She has not required sedation. Past Medical History:   Diagnosis Date    Asthma     HTN (hypertension)     Metastatic breast cancer (HCC)     PAF (paroxysmal atrial fibrillation) (HCC)        No past surgical history on file. No family history on file.     Social History     Socioeconomic History    Marital status: SINGLE       Prior to Admission medications    Not on File       Allergies   Allergen Reactions    Latex Itching    Codeine Itching    Lisinopril Cough    Venlafaxine Other (comments)     confusion       Review of Systems  Unable to obtain        Physical Exam:     Physical Exam:  Visit Vitals  BP (!) 81/57   Pulse 74   Temp (!) 94.3 °F (34.6 °C)   Resp 15   Ht 5' 2\" (1.575 m)   Wt 77.1 kg (170 lb)   SpO2 100%   BMI 31.09 kg/m²           Temp (24hrs), Av.3 °F (34.6 °C), Min:94.3 °F (34.6 °C), Max:94.3 °F (34.6 °C)    No intake/output data recorded. No intake/output data recorded. General:  As above. Head:  Normocephalic, without obvious abnormality, atraumatic. Eyes:  Conjunctivae/corneas clear, sclera anicteric, pupils non reactive. Nose: Nares normal. No drainage or sinus tenderness. Throat: Lips,  normal.    Neck: Supple, symmetrical, trachea midline. Lungs:   Clear to auscultation bilaterally. Heart:   S1, S2, no murmur, click, rub or gallop. Abdomen: Soft, non-tender. Bowel sounds normal. No masses,  No organomegaly. Extremities: Extremities normal, atraumatic, no cyanosis or edema. Capillary refill normal.   Pulses: 2+ and symmetric all extremities.                Labs Reviewed:    CMP:   Lab Results   Component Value Date/Time     2021 04:30 PM    K 2.6 (LL) 2021 04:30 PM    CL 99 (L) 2021 04:30 PM    CO2 24 2021 04:30 PM    AGAP 19 (H) 2021 04:30 PM     (H) 2021 04:30 PM    BUN 17 2021 04:30 PM    CREA 1.89 (H) 2021 04:30 PM    GFRAA 32 (L) 2021 04:30 PM    GFRNA 26 (L) 2021 04:30 PM    CA 9.5 2021 04:30 PM    MG 2.1 2021 12:50 PM    PHOS 3.6 2021 04:31 PM    ALB 2.8 (L) 2021 04:31 PM    TP 5.7 (L) 2021 12:50 PM    GLOB 3.4 2021 12:50 PM    AGRAT 0.7 (L) 2021 12:50 PM    ALT 89 (H) 2021 12:50 PM     CBC:   Lab Results   Component Value Date/Time    WBC 7.7 2021 04:30 PM    HGB 13.4 2021 04:30 PM    HCT 46.0 (H) 2021 04:30 PM     2021 04:30 PM     All Cardiac Markers in the last 24 hours:   Lab Results   Component Value Date/Time     (H) 12/16/2021 12:50 PM    CKMB 26.8 (H) 12/16/2021 12:50 PM    CKND1 9.1 (H) 12/16/2021 12:50 PM         Procedures/imaging: see electronic medical records for all procedures/Xrays and details which were not copied into this note but were reviewed prior to creation of Plan    Please note that this dictation was completed with [employer], the [employer] voice recognition software. Quite often unanticipated grammatical, syntax, homophones, and other interpretive errors are inadvertently transcribed by the computer software. Please disregard these errors. Please excuse any errors that have escaped final proofreading.         CC: Enrrique Hernandez MD

## 2021-12-16 NOTE — ED NOTES
Patient arrives to ED in cardiac arrest. EMS reports patient was found down and CPR was initiated. EMS reports they achieved ROSC 2 minutes prior to arrival however patient arrived in cardiac arrest again. Rosc achieved at 1056am. Pt with equal breath sounds at this time. Pt with GCS of 3. Pt with hx of breast and lung cancer. Attempting to maintain stability at this time.

## 2021-12-16 NOTE — PROGRESS NOTES
[address] [facility] Pharmacokinetic Monitoring Service - Vancomycin     Ju Tapia is a 73 y.o. female starting on vancomycin therapy for sepsis. Pharmacy consulted by Dr. Tarah Barrientos for monitoring and adjustment. Target Concentration: Goal AUC/REJI 400-600 mg*hr/L    Additional Antimicrobials: Zosyn, Levaquin    Pertinent Laboratory Values: Wt Readings from Last 1 Encounters:   12/16/21 77.1 kg (170 lb)     Temp Readings from Last 1 Encounters:   12/16/21 (!) 94.3 °F (34.6 °C)     No components found for: PROCAL  Estimated Creatinine Clearance: 32.9 mL/min (A) (based on SCr of 1.55 mg/dL (H)). Recent Labs     12/16/21  1630 12/16/21  1109   WBC 7.7 5.5       Pertinent Cultures:  Culture Date Source Results   None NA NA   MRSA Nasal Swab: N/A. Non-respiratory infection. .    Plan:  Dosing recommendations based on Bayesian software  Start vancomycin 2000 mg IV @ 1800 12/16/21, then vancomycin 1000 mg IV q24h @ 1800 12/17/21  Anticipated AUC of 510 mg*h/L and trough concentration of 16.4 mcg/ml at steady state  Renal labs as indicated   Pharmacy will continue to monitor patient and adjust therapy as indicated    Thank you for the consult,  KARMEN Mccabe  12/16/2021 5:42 PM

## 2021-12-16 NOTE — PROGRESS NOTES
Pt came in via ems coding, airway was placed in field and confirmed by glidescope and etco2. Pt was placed on the vent once CPR was stopped. ABG was obtained and showed to ER Dr. [email or web address], BBS are diminished. Will continue to monitor.

## 2021-12-16 NOTE — CONSULTS
P[facility] Specialists  Pulmonary, Critical Care, and Sleep Medicine    Name: Georgiana Bright MRN: [id number]   : 1950 Hospital: [facility] [place]    Date: 2021  Room: [address] Note                                              Consult requesting physician: Dr. Loni El  Reason for Consult: cardiopulmonary  Arrest, hypoxemic respiratory failure, unresponsiveness, metastatic breast cancer, renal failure, metabolic acidosis      IMPRESSIOn   · Acute pulmonary arrest  · Respiratory failure  · Shock  · Metastatic breast cancer  · Hyperglycemia  · Metabolic acidosis  · Congestive heart failure  Patient Active Problem List   Diagnosis Code    Shock ([facility] R57.9    Cardiac arrest ([facility] [address] I46.9    Respiratory failure ([address] J96.90    Breast cancer ([facility] [address] C50.919    SAUNDRA (acute kidney injury) ([facility] [address] N17.9    LFT elevation R79.89    Acute hyperglycemia R73.9   ·       PMH I reviewed chart from  [facility]   Past Medical History:   Diagnosis Date    Aneurysm (CMS/HCC)    Breast cancer (CMS/HCC)    Cataract    Cholelithiasis 3/24/2009    Chronic kidney disease (CKD) stage G3a/A1, moderately decreased glomerular filtration rate (GFR) between 45-59 mL/min/1.73 square meter and albuminuria creatinine ratio less than 30 mg/g (CMS/HCC) 10/17/2021    Chronic systolic heart failure (CMS/HCC) 3/24/2009    Dilated cardiomyopathy (CMS/HCC) 5/15/2017    Gastroesophageal reflux disease with esophagitis 2012    Malignant neoplasm of breast (CMS/HCC) 3/24/2009    Malignant neoplasm of upper outer quadrant of breast in female, estrogen receptor negative (CMS/HCC) 2021    Malignant neoplasm of upper outer quadrant of breast in female, estrogen receptor negative (CMS/HCC) 2021    Neuropathy (CMS/HCC)    Paroxysmal atrial fibrillation (CMS/HCC) 2016    Paroxysmal ventricular tachycardia (CMS/HCC) 2009    Presence of cardiac pacemaker 2009   NO Device it was explanted however she does have leads that are capped off in place    Stroke (cerebrum) (CMS/HCC)   2018    Thrombocytopenia (CMS/[facility]) 7/27/2017         Code status: full code   Recent visit with palliative care at [facility] evaluated  Stopped all tx for cancer stage 4    RECOMMENDATIONS:    Respiratory:   Cardiopulmonary arrest initially in PEA no pulmonary embolus. Multifactorial has metastatic breast cancer cardiomyopathy. Continue full ventilatory support. ABG evaluated needs to be placed on ventilator currently on portable ventilator. ABG 7.2 6/44/378 volume 400 FiO2 100% PEEP 6  Wean FiO2 for goal PO2 above 65  CT of the chest evaluated. No PE aspiration pneumonia possible infiltrate  abx  and bronchodilators ordered    Keep SPO2 >=92%. HOB 30 degree elevation all the time. Aggressive pulmonary toileting. Aspiration precautions. Incentive spirometry. CVS: cardiopulmonary arrest history of cardiomyopathy and pacemaker. On Eliquis and Lasix and digoxin at home suspect has low ejection fraction? Echo pending. Status post cardiac arrest over 30 minutes. Continue epi continue Levophed if worsen add vasopressin. Continue Albumin's. IV fluid resuscitation. ID: Sepsis less likely shock most likely due to cardiopulmonary arrest from primary cardiac issues and complications of breast cancer  Panculture. Normal White blood cells patient is off all chemotherapy. Broad-spectrum antibiotics vancomycin and Zosyn. Sepsis bundle and protocol followed. Follow serial lactic acid, frequent BMP check, fluid resuscitation. Follow cultures. Deescalate antibiotic when appropriate. Hematology/Oncology: Stage IV breast cancer recently stopped chemotherapy due to ineffective treatment was talking to palliative care. We will consult oncology and palliative care. Monitor hemoglobin white blood cells and coagulation profile patient was on Eliquis at home no PE.   Post prolonged cardiac arrest will be in high risk of bleeding into chest post prolonged CPR will start low-dose Lovenox and reevaluate    Renal: Acute on chronic renal failure with severe hyperkalemia, hyperglycemia, electrolyte derangement, dehydration, metabolic acidosis. Replace potassium and start insulin drip after. Continue hydration. Electrolyte replacement protocol per ICU placed start. Monitor kidney function. GI/: Shock liver no acute abnormalities on CT of the abdomen and pelvis no perforation    Endocrine: Monitor BS. SSI. Acute hyperglycemia possible DKA anion gap mildly elevated. Follow. Start insulin drip after replacement of potassium check magnesium phosphorus    Neurology: CT of the head evaluated no acute CVA or bleeding. Unresponsive post prolonged cardiac arrest pupils nonreactive 3 mm  I suspect. No sedation given I suspect anoxic brain injury. If uncomfortable or unable to ventilate we will start as needed low-dose of Versed and fentanyl        Toxicology: None    Pain/Sedation: RN fentanyl for pain    Skin/Wound: none    Electrolytes: Replace electrolytes per ICU electrolyte replacement protocol. IVF: DKA protocol    Nutrition: P.o. Prophylaxis: DVT Prophylaxis: SCD/Lovenox if no bleeding will resume Eliquis GI Prophylaxis:     Restraints: none  Responsive after cardiac arrest    Lines/Tubes: PIV  ETT: In place  OGT/NGT: Place once stable  Patient has port we are using it for pressors    Infante: Place Infante (Medically necessary for strict input/output monitoring in critically ill patient, will remove it when not needed. Infante bundle followed). Other:    PT/OT eval and treat. OOB. Advance Directive/Palliative Care: I consulted palliative care and evaluated recent on December 10 palliative care consultation patient did not want any extreme measurement but she did not sign officially DNR/DNI or declared hospice. So was in the gray zone. Code intubated CPR prolonged.   Prognosis extremely poor I had long discussion with sister she is going to discuss it with family. I do not recommend to repeat CPR but final decision from family pending    Will defer respective systems problem management to primary and other respective consultant and follow patient in ICU with primary and other medical team.  Further recommendations will be based on the patient's response to recommended treatment and results of the investigation ordered. Quality Care: PPI, DVT prophylaxis, HOB elevated, Infection control all reviewed and addressed. Care of plan d/w RN, RT, MDR, hospitalist team.    High complexity decision making was performed during the evaluation of this patient at high risk for decompensation with multiple organ involvement. Total critical care time spent rendering care exclusive of procedures/family discussion/coordination of care: 90 minutes. Subjective/History of Present Illness:     Patient is a 73 y.o. female with PMHx significant for congestive heart failure, atrial fibrillation, pacemaker, breast  cancer stage IV recurrence of the disease [place] of treatment seeing palliative care CODE STATUS was not clarified in the chart, on anticoagulation with Eliquis. Patient was recently seen by palliative care all evaluated note she said she does not want any extreme measures, however she was not declared fully DNR/DNI on hospice. Based on the last note. Patient was unwell and she had advanced breast cancer with recurrence of the disease that started 27 years ago, recently stopped all the treatment as it was ineffective, patient had cardiac arrest at home. EMS on arrival started ACLS protocol for PEA arrest.  Epi was given. Also V. fib reported 3 times defibrillated at home. ROSC achieved en route. Cardiac arrest again in ambulance given bicarbonate amiodarone Narcan. Blood sugar was stable 135. Patient was intubated. On arrival to emergency room still chest compressions ongoing. No pulse.   Another 25 minutes of CPR in the emergency room. Return of pulse. Patient is currently on 2 of epi, Levophed 8, intubated, blood pressure systolic 936T, unresponsive with no sedation. Pupils no response. No spontaneous movement. CT of the head evaluated CTA of the chest no PE CT of the abdomen no acute GI issues. Known metastatic breast cancer to lungs. I met with sister at the bedside who is healthcare proxy. Continue current care. I have consulted palliative care. Family will discuss further steps    12/16/2021:  Patient evaluated in the emergency room room 5. Intubated on portable ventilation. Currently on 2 pressors. Unresponsive not on sedation. Not responsive to painful stimuli. CT of the head chest abdomen pelvis evaluated. Continued on pressors IV fluids antibiotics insulin drip added and electrolyte replacement protocol start place. No pulmonary embolus. Patient has known cardiomyopathy was at home on Eliquis and has pacemaker. Prognosis is extremely poor I explained that to sister and I called palliative care for consultation        I/O last 24 hrs: No intake or output data in the 24 hours ending 12/16/21 1525      The patient is critically ill and can not provide additional history due to Unconsciousness    History taken from sister and EMR Ed     Review of Systems:    ROS not obtained due to patient factor. Allergies   Allergen Reactions    Latex Itching    Codeine Itching    Lisinopril Cough    Venlafaxine Other (comments)     confusion      No past medical history on file. No past surgical history on file. Social History     Tobacco Use    Smoking status: Not on file    Smokeless tobacco: Not on file   Substance Use Topics    Alcohol use: Not on file      No family history on file.    Prior to Admission medications    Not on File     Current Facility-Administered Medications   Medication Dose Route Frequency    sodium chloride 0.9 % bolus infusion 313 mL  313 mL IntraVENous ONCE    NOREPINephrine (LEVOPHED) 8 mg in 0.9% NS 250ml infusion  0.5-16 mcg/min IntraVENous TITRATE    vancomycin (VANCOCIN) 2000 mg in  ml infusion  2,000 mg IntraVENous ONCE    potassium chloride 10 mEq in 100 ml IVPB  10 mEq IntraVENous NOW    insulin regular (NOVOLIN R, HUMULIN R) 100 Units in 0.9% sodium chloride 100 mL infusion  0-50 Units/hr IntraVENous TITRATE    0.9% sodium chloride infusion  125 mL/hr IntraVENous CONTINUOUS    potassium bicarb-citric acid (EFFER-K) tablet 40 mEq  40 mEq Oral NOW    [START ON 12/17/2021] pantoprazole (PROTONIX) 40 mg in 0.9% sodium chloride 10 mL injection  40 mg IntraVENous DAILY    midazolam in normal saline (VERSED) 1 mg/mL infusion  1-2 mg/hr IntraVENous TITRATE    sodium bicarbonate (8.4%) 150 mEq in dextrose 5% 1,000 mL infusion   IntraVENous CONTINUOUS    piperacillin-tazobactam (ZOSYN) 3.375 g in 0.9% sodium chloride (MBP/ADV) 100 mL MBP  3.375 g IntraVENous Q6H    enoxaparin (LOVENOX) injection 40 mg  40 mg SubCUTAneous Q24H         Objective:   Vital Signs:    Visit Vitals  BP (!) 138/57   Pulse 98   Resp 15   Ht 5' 2\" (1.575 m)   Wt 77.1 kg (170 lb)   SpO2 100%   BMI 31.09 kg/m²               No data recorded. Intake/Output:   Last shift:      No intake/output data recorded. Last 3 shifts: No intake/output data recorded.     No intake or output data in the 24 hours ending 12/16/21 1525    Last 3 Recorded Weights in this Encounter    12/16/21 1058 12/16/21 1521   Weight: 77.1 kg (170 lb) 77.1 kg (170 lb)           Mode Rate Tidal Volume Pressure FiO2 PEEP       400 ml    50 % 6 cm H20     Peak airway pressure:      Plateau pressure:     Tidal volume:    Minute ventilation:       Recent Labs     12/16/21  1123 12/16/21  1107   PHI 7.26*  --    PCO2I 44.9  --    PO2I 378*  --    HCO3I 19.9* 29.3*   FIO2 100  --        Physical Exam:     General/Neurology: intubated  nonsedated unresponsive pupil nonreactive, respiratory rate above the vent, withdrawal to painful stimuli, chronically  ill-appearing  Head:   Normocephalic, without obvious abnormality, atraumatic. Eye:   EOM intact. No scleral icterus, no pallor, no cyanosis. Nose:   No sinus tenderness  Throat:  Lips, mucosa, and tongue normal. No oral thrush. Neck:   Supple, symmetric. No lymphadenopathy. Trachea midline  Lung: Moderate air entry bilateral equal bila  No rales at the bases  No rhonchi. No wheezing. No stridors. No prolongded expiration. No accessory muscle use. Heart:   Regular rate & rhythm. S1 S2 present. No murmur. No JVD. Abdomen:  Soft. NT. ND. +BS. No masses. Extremities:  Nopedal edema. No cyanosis. No clubbing. Pulses: 2+ and symmetric in DP. Capillary refill: normal  Lymphatic:  No cervical or supraclavicular palpable lymphadenopathy. Musculoskeletal: No joint swelling. No tenderness. Skin:   Color, texture, turgor normal. No rashes or lesions. Data:       Recent Results (from the past 24 hour(s))   BLOOD GAS,CHEM8,LACTIC ACID POC    Collection Time: 12/16/21 11:07 AM   Result Value Ref Range    Calcium, ionized (POC) 1.20 1. 12 - 1.32 mmol/L    Base deficit (POC) 2.3 mmol/L    HCO3 (POC) 29.3 (H) 22 - 26 MMOL/L    CO2, POC 31 (H) 19 - 24 MMOL/L    O2 SAT 55 %    Sample source VENOUS BLOOD      Performed by Harriet Standard     Sodium (POC) 145 136 - 145 mmol/L    Potassium (POC) 3.4 (L) 3.5 - 5.1 mmol/L    Glucose (POC) 259 (H) 65 - 100 mg/dL    Creatinine (POC) 1.27 0.6 - 1.3 mg/dL    Lactic Acid (POC) 16.06 (HH) 0.40 - 2.00 mmol/L    Chloride (POC) 102 98 - 107 mmol/L    Anion gap, POC 13 10 - 20      GFRAA, POC 51 (L) >60 ml/min/1.73m2    GFRNA, POC 42 (L) >60 ml/min/1.73m2    pH, venous (POC) 7.14 (LL) 7.32 - 7.42      pCO2, venous (POC) 85.2 (H) 41 - 51 MMHG    pO2, venous (POC) 39 25 - 40 mmHg   EKG, 12 LEAD, INITIAL    Collection Time: 12/16/21 11:07 AM   Result Value Ref Range    Ventricular Rate 63 BPM    QRS Duration 136 ms    Q-T Interval 562 ms    QTC Calculation (Bezet) 575 ms    Calculated R Axis -78 degrees    Calculated T Axis 111 degrees    Diagnosis       Wide QRS rhythm  Left axis deviation  Right bundle branch block  Inferior infarct , age undetermined  Abnormal ECG  When compared with ECG of 29-OCT-2009 09:01,  Wide QRS rhythm has replaced Sinus rhythm     CBC WITH AUTOMATED DIFF    Collection Time: 12/16/21 11:09 AM   Result Value Ref Range    WBC 5.5 4.6 - 13.2 K/uL    RBC 3.89 (L) 4.20 - 5.30 M/uL    HGB 11.6 (L) 12.0 - 16.0 g/dL    HCT 40.4 35.0 - 45.0 %    .9 (H) 78.0 - 100.0 FL    MCH 29.8 24.0 - 34.0 PG    MCHC 28.7 (L) 31.0 - 37.0 g/dL    RDW 19.4 (H) 11.6 - 14.5 %    PLATELET 023 317 - 596 K/uL    MPV 10.4 9.2 - 11.8 FL    NRBC 0.5 (H) 0  WBC    ABSOLUTE NRBC 0.03 (H) 0.00 - 0.01 K/uL    NEUTROPHILS 26 (L) 40 - 73 %    BAND NEUTROPHILS 4 0 - 5 %    LYMPHOCYTES 64 (H) 21 - 52 %    MONOCYTES 2 (L) 3 - 10 %    EOSINOPHILS 2 0 - 5 %    BASOPHILS 0 0 - 2 %    METAMYELOCYTES 1 (H) 0 %    MYELOCYTES 1 (H) 0 %    IMMATURE GRANULOCYTES 0 %    ABS. NEUTROPHILS 1.7 (L) 1.8 - 8.0 K/UL    ABS. LYMPHOCYTES 3.5 0.9 - 3.6 K/UL    ABS. MONOCYTES 0.1 0.05 - 1.2 K/UL    ABS. EOSINOPHILS 0.1 0.0 - 0.4 K/UL    ABS. BASOPHILS 0.0 0.0 - 0.1 K/UL    ABS. IMM.  GRANS. 0.0 K/UL    DF MANUAL      RBC COMMENTS MACROCYTOSIS  2+        RBC COMMENTS POLYCHROMASIA  2+        RBC COMMENTS HYPOCHROMIA  1+        RBC COMMENTS SCHISTOCYTES  2+        RBC COMMENTS SPHEROCYTES  FEW       TYPE & SCREEN    Collection Time: 12/16/21 11:09 AM   Result Value Ref Range    Crossmatch Expiration 12/19/2021,2359     ABO/Rh(D) Marlen Mcdaniel POSITIVE     Antibody screen NEG    BLOOD GAS,CHEM8,LACTIC ACID POC    Collection Time: 12/16/21 11:23 AM   Result Value Ref Range    pH (POC) 7.26 (L) 7.35 - 7.45      pCO2 (POC) 44.9 35.0 - 45.0 MMHG    pO2 (POC) 378 (H) 80 - 100 MMHG    Calcium, ionized (POC) 1.28 1.12 - 1.32 mmol/L    Base deficit (POC) 7.1 mmol/L    HCO3 (POC) 19.9 (L) 22 - 26 MMOL/L    CO2, POC 21 19 - 24 MMOL/L    O2  %    Sample source ARTERIAL      SITE RIGHT RADIAL      STEPHIE'S TEST Positive      Device: ADULT VENT      FIO2 100 %    Tidal volume 400      PEEP/CPAP 6      Performed by Roberto DEAL     Sodium (POC) 143 136 - 145 mmol/L    Potassium (POC) 3.1 (L) 3.5 - 5.1 mmol/L    Glucose (POC) 238 (H) 65 - 100 mg/dL    Creatinine (POC) 1.27 0.6 - 1.3 mg/dL    Lactic Acid (POC) 16.06 (HH) 0.40 - 2.00 mmol/L    Chloride (POC) 99 98 - 107 mmol/L    Anion gap, POC 24 (H) 10 - 20      GFRAA, POC 51 (L) >60 ml/min/1.73m2    GFRNA, POC 42 (L) >60 ml/min/1.73m2   URINALYSIS W/ RFLX MICROSCOPIC    Collection Time: 12/16/21 12:50 PM   Result Value Ref Range    Color DARK YELLOW      Appearance CLEAR      Specific gravity 1.017 1.005 - 1.030      pH (UA) 5.5 5.0 - 8.0      Protein TRACE (A) NEG mg/dL    Glucose Negative NEG mg/dL    Ketone Negative NEG mg/dL    Bilirubin Negative NEG      Blood Negative NEG      Urobilinogen 1.0 0.2 - 1.0 EU/dL    Nitrites Negative NEG      Leukocyte Esterase Negative NEG     TROPONIN-HIGH SENSITIVITY    Collection Time: 12/16/21 12:50 PM   Result Value Ref Range    Troponin-High Sensitivity 6,834 (HH) 0 - 54 ng/L   CKMB PROFILE    Collection Time: 12/16/21 12:50 PM   Result Value Ref Range     (H) 26 - 192 U/L    CK - MB 26.8 (H) <3.6 ng/ml    CK-MB Index 9.1 (H) 0.0 - 4.0 %   METABOLIC PANEL, COMPREHENSIVE    Collection Time: 12/16/21 12:50 PM   Result Value Ref Range    Sodium 134 (L) 136 - 145 mmol/L    Potassium 2.5 (LL) 3.5 - 5.5 mmol/L    Chloride 91 (L) 100 - 111 mmol/L    CO2 25 21 - 32 mmol/L    Anion gap 18 3.0 - 18 mmol/L    Glucose 533 (HH) 74 - 99 mg/dL    BUN 13 7.0 - 18 MG/DL    Creatinine 1.55 (H) 0.6 - 1.3 MG/DL    BUN/Creatinine ratio 8 (L) 12 - 20      GFR est AA 40 (L) >60 ml/min/1.73m2    GFR est non-AA 33 (L) >60 ml/min/1.73m2    Calcium 9.0 8.5 - 10.1 MG/DL    Bilirubin, total 1.3 (H) 0.2 - 1.0 MG/DL    ALT (SGPT) 89 (H) 13 - 56 U/L    AST (SGOT) 231 (H) 10 - 38 U/L    Alk. phosphatase 135 (H) 45 - 117 U/L    Protein, total 5.7 (L) 6.4 - 8.2 g/dL    Albumin 2.3 (L) 3.4 - 5.0 g/dL    Globulin 3.4 2.0 - 4.0 g/dL    A-G Ratio 0.7 (L) 0.8 - 1.7     URINE MICROSCOPIC ONLY    Collection Time: 12/16/21 12:50 PM   Result Value Ref Range    WBC 0 to 3 0 - 5 /hpf    RBC 0 to 3 0 - 5 /hpf    Epithelial cells 1+ 0 - 5 /lpf    Bacteria FEW (A) NEG /hpf    Mucus FEW (A) NEG /lpf    CA Oxalate crystals FEW (A) NEG           Chemistry Recent Labs     12/16/21  1250   *   *   K 2.5*   CL 91*   CO2 25   BUN 13   CREA 1.55*   CA 9.0   AGAP 18   BUCR 8*   *   TP 5.7*   ALB 2.3*   GLOB 3.4   AGRAT 0.7*        Lactic Acid No results found for: LAC  No results for input(s): LAC in the last 72 hours. Liver Enzymes Protein, total   Date Value Ref Range Status   12/16/2021 5.7 (L) 6.4 - 8.2 g/dL Final     Albumin   Date Value Ref Range Status   12/16/2021 2.3 (L) 3.4 - 5.0 g/dL Final     Globulin   Date Value Ref Range Status   12/16/2021 3.4 2.0 - 4.0 g/dL Final     A-G Ratio   Date Value Ref Range Status   12/16/2021 0.7 (L) 0.8 - 1.7   Final     Alk. phosphatase   Date Value Ref Range Status   12/16/2021 135 (H) 45 - 117 U/L Final     Recent Labs     12/16/21  1250   TP 5.7*   ALB 2.3*   GLOB 3.4   AGRAT 0.7*   *        CBC w/Diff Recent Labs     12/16/21  1109   WBC 5.5   RBC 3.89*   HGB 11.6*   HCT 40.4      GRANS 26*   LYMPH 64*   EOS 2        Cardiac Enzymes Lab Results   Component Value Date/Time     (H) 12/16/2021 12:50 PM    CKMB 26.8 (H) 12/16/2021 12:50 PM    CKND1 9.1 (H) 12/16/2021 12:50 PM        BNP No results found for: BNP, BNPP, XBNPT     Coagulation No results for input(s): PTP, INR, APTT, INREXT in the last 72 hours.       Thyroid  Lab Results   Component Value Date/Time    TSH 1.00 10/30/2009 02:17 AM       No results found for: T4     Urinalysis Lab Results   Component Value Date/Time    Color DARK YELLOW 12/16/2021 12:50 PM    Appearance CLEAR 12/16/2021 12:50 PM    Specific gravity 1.017 12/16/2021 12:50 PM    pH (UA) 5.5 12/16/2021 12:50 PM    Protein TRACE (A) 12/16/2021 12:50 PM    Glucose Negative 12/16/2021 12:50 PM    Ketone Negative 12/16/2021 12:50 PM    Bilirubin Negative 12/16/2021 12:50 PM    Urobilinogen 1.0 12/16/2021 12:50 PM    Nitrites Negative 12/16/2021 12:50 PM    Leukocyte Esterase Negative 12/16/2021 12:50 PM    Epithelial cells 1+ 12/16/2021 12:50 PM    Bacteria FEW (A) 12/16/2021 12:50 PM    WBC 0 to 3 12/16/2021 12:50 PM    RBC 0 to 3 12/16/2021 12:50 PM        Micro  No results for input(s): SDES, CULT in the last 72 hours. No results for input(s): CULT in the last 72 hours. Culture data during this hospitalization. All Micro Results     Procedure Component Value Units Date/Time    CULTURE, RESPIRATORY/SPUTUM/BRONCH Riccardo Verde [[id number]]     Order Status: Sent Specimen: Sputum from Transtracheal Aspirate     CULTURE, BLOOD [[id number]]     Order Status: Sent Specimen: Blood     COVID-19 RAPID TEST [[id number]]     Order Status: Sent     CULTURE, BLOOD [[id number]]     Order Status: Sent Specimen: Blood     CULTURE, BLOOD [[id number]]     Order Status: Sent Specimen: Blood              CT HEAD WO CONT    Result Date: 12/16/2021  EXAM: CT head INDICATION: Cardiac arrest, metastatic breast cancer COMPARISON: None. TECHNIQUE: Axial CT imaging of the head was performed without intravenous contrast. Standard multiplanar coronal and sagittal reformatted images were obtained and are included in interpretation. One or more dose reduction techniques were used on this CT: automated exposure control, adjustment of the mAs and/or kVp according to patient size, and iterative reconstruction techniques. The specific techniques used on this CT exam have been documented in the patient's electronic medical record.   Digital Imaging and Communications in Medicine (DICOM) format image data are available to nonaffiliated external healthcare facilities or entities on a secure, media free, reciprocally searchable basis with patient authorization for at least a 12-month period after this study. _______________ FINDINGS: BRAIN AND POSTERIOR FOSSA: Mild cortical and cerebellar volume loss is present. The ventricular size and configuration is within normal limits. Basilar cisterns are patent. Mild periventricular white matter low-attenuation is present. There is no intracranial hemorrhage, mass effect, or midline shift. The gray-white matter differentiation is within normal limits. EXTRA-AXIAL SPACES AND MENINGES: There are no abnormal extra-axial fluid collections. CALVARIUM: Intact. SINUSES: Layering fluid present within the sphenoid sinus with mild mucosal thickening of the anterior and posterior ethmoid air cells. OTHER: Ocular lenses are surgically replaced _______________     1. No acute intracranial abnormality demonstrated. 2. Subcortical and periventricular white matter low-attenuation as can be seen with sequela of chronic ischemic microvascular change 3. Nonspecific paranasal mucosal thickening/fluid    CTA CHEST W OR W WO CONT    Result Date: 12/16/2021  EXAM: CTA Chest INDICATION: Cardiac arrest, patient with history of breast cancer COMPARISON: No prior study. TECHNIQUE: Axial CT imaging from the thoracic inlet through the diaphragm with intravenous contrast utilizing CTA study for pulmonary artery evaluation. Coronal and sagittal MIP reformations were generated at a separate workstation. One or more dose reduction techniques were used on this CT: automated exposure control, adjustment of the mAs and/or kVp according to patient size, and iterative reconstruction techniques. The specific techniques used on this CT exam have been documented in the patient's electronic medical record.   Digital Imaging and Communications in Medicine (DICOM) format image data are available to nonaffiliated external healthcare facilities or entities on a secure, media free, reciprocally searchable basis with patient authorization for at least a 12-month period after this study. _______________ FINDINGS: EXAM QUALITY: Overall exam quality is adequate. Pulmonary arterial enhancement is adequate. Although the exam is degraded by motion artifact, distal filter be diagnostic for the exclusion of pulmonary embolism. PULMONARY ARTERIES: No convincing evidence of pulmonary embolism. MEDIASTINUM: Cardiac size is mildly enlarged. Thoracic aorta is essentially unenhanced appearance but normal in course and caliber. No evidence of pericardial effusion. Right chest wall Mediport catheter with tip present in the SVC. LYMPH NODES: No enlarged mediastinal or hilar nodes by size criteria. AIRWAY: Endotracheal tube appropriately positioned. LUNGS: Multifocal areas of consolidation are demonstrated, greatest in conspicuity within the right upper lobe, both superiorly and posteriorly, throughout the right middle lobe, and medial portions of the lower lobes bilaterally, right greater than left. PLEURA: No pneumothorax. Small/trace bilateral pleural effusions. UPPER ABDOMEN: Partial inclusion nasogastric tube within the stomach. Reflux of contrast into the infrahepatic IVC and hepatic veins. . OTHER: There are multiple rib fractures present:   > On the right, there are moderately displaced anterolateral fractures of the third, fourth, fifth, sixth, seventh, eighth, and ninth ribs.   > On the left there are mildly displaced fractures of the left third, fourth, fifth ribs. Vertebral body heights are normal. No compression fracture. Partial inclusion right breast prosthesis. _______________     1. No evidence of pulmonary embolism. 2. Multifocal areas of alveolar consolidation, greatest across the right upper, middle, and lower lobes as described above, potentially pneumonitis related to aspiration given the distribution.  3. Small bilateral pleural effusions without pneumothorax. 4. Multiple bilateral rib fractures as described in detail above. CT ABD PELV W CONT    Result Date: 12/16/2021  EXAM: CT of the abdomen and pelvis INDICATION: Abdominal pain, cardiac arrest, breast cancer, respiratory failure. COMPARISON: None. TECHNIQUE: Axial CT imaging of the abdomen and pelvis was performed with intravenous contrast. Multiplanar reformats were generated. One or more dose reduction techniques were used on this CT: automated exposure control, adjustment of the mAs and/or kVp according to patient size, and iterative reconstruction techniques. The specific techniques used on this CT exam have been documented in the patient's electronic medical record. Digital Imaging and Communications in Medicine (DICOM) format image data are available to nonaffiliated external healthcare facilities or entities on a secure, media free, reciprocally searchable basis with patient authorization for at least a 12-month period after this study. _______________ FINDINGS: LOWER CHEST: Please see contemporaneously performed CT angiogram of the chest. LIVER, BILIARY: Significantly heterogeneous enhancement pattern to the liver noted with dilated infrahepatic IVC. No biliary dilation. Cholecystectomy. PANCREAS: Normal. SPLEEN: Normal. ADRENALS: Normal. KIDNEYS/URETERS/BLADDER: No hydronephrosis. Symmetric renal enhancement. Urinary bladder decompressed with Infante catheter in situ. PELVIC ORGANS: Hysterectomy. VASCULATURE: Mild diffuse aortobiiliac atherosclerosis without evidence of aneurysmal dilatation. LYMPH NODES: No enlarged lymph nodes. GASTROINTESTINAL TRACT: Nasogastric tube is appropriately positioned within the stomach. Evaluation of small and large bowel is somewhat limited secondary to respiratory motion artifact. No morphology of bowel obstruction. No free intraperitoneal gas. Normal appendix. BONES: No acute or aggressive osseous abnormalities identified. Lower lumbar spondylosis and facet joint osteoarthritis. OTHER: None. _______________     1. Heterogeneous parenchymal enhancement which may be accentuated by contrast bolus timing. These findings may also be accentuated by low cardiac output state/congestive hepatopathy. 2. Nasogastric tube appropriately positioned within the stomach. No evidence of bowel obstruction or free intraperitoneal gas. 3. No hydronephrosis. Urinary bladder decompressed with Infante catheter in situ. XR CHEST PORT    Result Date: 12/16/2021  EXAM: XR CHEST PORT CLINICAL INDICATION/HISTORY: meets SIRS criteria -Additional: Cardiac arrest, metastatic breast cancer COMPARISON: Radiographs 10/29/2009 TECHNIQUE: Portable frontal view of the chest _______________ FINDINGS: SUPPORT DEVICES: Endotracheal tube is approximately 2.3 cm above the jean. Nasogastric tube is below the diaphragm, tip collimated from view. Right chest wall Mediport catheter tip projecting at the superior cavoatrial junction. HEART AND MEDIASTINUM: Normal appearing cardiac size and mediastinal contours. LUNGS AND PLEURAL SPACES: There are areas of diffuse bilateral pulmonary opacification, right greater than left. No evidence of pneumothorax or definite pleural effusion. BONY THORAX AND SOFT TISSUES: Bilateral axillary surgical clips. No acute osseous abnormality demonstrated. _______________     1. Support devices in appropriate position. 2. Asymmetric, diffuse areas of pulmonary opacification, potentially edema and-or pneumonia. PFT       Ultrasound       LE Doppler       ECHO       Images report reviewed by me:  CT (Most Recent) (CT chest reviewed by me) Results from Hospital Encounter encounter on 12/16/21    CTA CHEST W OR W WO CONT    Narrative  EXAM: CTA Chest    INDICATION: Cardiac arrest, patient with history of breast cancer    COMPARISON: No prior study.     TECHNIQUE: Axial CT imaging from the thoracic inlet through the diaphragm with  intravenous contrast utilizing CTA study for pulmonary artery evaluation. Coronal and sagittal MIP reformations were generated at a separate workstation. One or more dose reduction techniques were used on this CT: automated exposure  control, adjustment of the mAs and/or kVp according to patient size, and  iterative reconstruction techniques. The specific techniques used on this CT  exam have been documented in the patient's electronic medical record. Digital  Imaging and Communications in Medicine (DICOM) format image data are available  to nonaffiliated external healthcare facilities or entities on a secure, media  free, reciprocally searchable basis with patient authorization for at least a  12-month period after this study. _______________    FINDINGS:    EXAM QUALITY: Overall exam quality is adequate. Pulmonary arterial enhancement  is adequate. Although the exam is degraded by motion artifact, distal filter be  diagnostic for the exclusion of pulmonary embolism. PULMONARY ARTERIES: No convincing evidence of pulmonary embolism. MEDIASTINUM: Cardiac size is mildly enlarged. Thoracic aorta is essentially  unenhanced appearance but normal in course and caliber. No evidence of  pericardial effusion. Right chest wall Mediport catheter with tip present in the  SVC. LYMPH NODES: No enlarged mediastinal or hilar nodes by size criteria. AIRWAY: Endotracheal tube appropriately positioned. LUNGS: Multifocal areas of consolidation are demonstrated, greatest in  conspicuity within the right upper lobe, both superiorly and posteriorly,  throughout the right middle lobe, and medial portions of the lower lobes  bilaterally, right greater than left. PLEURA: No pneumothorax. Small/trace bilateral pleural effusions. UPPER ABDOMEN: Partial inclusion nasogastric tube within the stomach. Reflux of  contrast into the infrahepatic IVC and hepatic veins. .    OTHER:  There are multiple rib fractures present:  > On the right, there are moderately displaced anterolateral fractures of the  third, fourth, fifth, sixth, seventh, eighth, and ninth ribs.  > On the left there are mildly displaced fractures of the left third, fourth,  fifth ribs. Vertebral body heights are normal. No compression fracture. Partial inclusion right breast prosthesis. _______________    Impression  1. No evidence of pulmonary embolism. 2. Multifocal areas of alveolar consolidation, greatest across the right upper,  middle, and lower lobes as described above, potentially pneumonitis related to  aspiration given the distribution. 3. Small bilateral pleural effusions without pneumothorax. 4. Multiple bilateral rib fractures as described in detail above. CXR reviewed by me:  XR (Most Recent). CXR  reviewed by me and compared with previous CXR Results from Hospital Encounter encounter on 12/16/21    XR CHEST PORT    Narrative  EXAM: XR CHEST PORT    CLINICAL INDICATION/HISTORY: meets SIRS criteria  -Additional: Cardiac arrest, metastatic breast cancer    COMPARISON: Radiographs 10/29/2009    TECHNIQUE: Portable frontal view of the chest    _______________    FINDINGS:    SUPPORT DEVICES: Endotracheal tube is approximately 2.3 cm above the jean. Nasogastric tube is below the diaphragm, tip collimated from view. Right chest  wall Mediport catheter tip projecting at the superior cavoatrial junction. HEART AND MEDIASTINUM: Normal appearing cardiac size and mediastinal contours. LUNGS AND PLEURAL SPACES: There are areas of diffuse bilateral pulmonary  opacification, right greater than left. No evidence of pneumothorax or definite  pleural effusion. BONY THORAX AND SOFT TISSUES: Bilateral axillary surgical clips. No acute  osseous abnormality demonstrated.    _______________    Impression  1. Support devices in appropriate position. 2. Asymmetric, diffuse areas of pulmonary opacification, potentially edema  and-or pneumonia. ·Please note: Voice-recognition software may have been used to generate this report, which may have resulted in some phonetic-based errors in grammar and contents. Even though attempts were made to correct all the mistakes, some may have been missed, and remained in the body of the document.       Cricket Mcmillanr, MD  12/16/2021

## 2021-12-16 NOTE — PROGRESS NOTES
responded to a [employer] for [employer], who is a 73 y.o.,female. Many family members arrived and I put them in the family room. Patient is not , has no children, parents are . She has one sister Charley Demarco who is present and two brothers in [place] who as a consensus would make any decisions needed. Charley Demarco is getting in touch with the brothers to keep them up to date. Patient also has a long term partner Samir Lloyd who was at the bedside. They are not legally  but is \"part of the family. \"   Charley Demarco said she wants Samir Lloyd and herself to be points of contact and understands she is the legal NOK. Many other family members were also present as was their  ([employer]). The  led them in prayer. Charley Demarco and Samir Lloyd spoke of how long she has fought cancer (27 years), her life, her star and her service to others. She is well loved. Initiated a relationship of care and support. Provided Crisis Pastoral Care  Listened empathically. Offered prayer and assurance of continued prayers on patient's behalf. Chart reviewed. Chaplains will continue to follow and will provide pastoral care on an as needed/requested basis.  recommends bedside caregivers page  on duty if patient shows signs of acute spiritual or emotional distress. [address], M.Div.   Board Certified   [phone] - Office

## 2021-12-16 NOTE — Clinical Note
Status[de-identified] INPATIENT [101]   Type of Bed: Intensive Care [6]   Cardiac Monitoring Required?: Yes   Inpatient Hospitalization Certified Necessary for the Following Reasons: 4. Patient requires ICU level of care interventions (further clarification in H&P documentation)   Admitting Diagnosis: Cardiac arrest ([facility] [address] [427. 5. ICD-9-CM]   Admitting Physician: Griselda Carnes [[id number]]   Attending Physician: Griselda Carnes [[id number]]   Estimated Length of Stay: 3-4 Midnights   Discharge Plan[de-identified] Home with Office Follow-up

## 2021-12-17 PROBLEM — G93.1 ANOXIC BRAIN DAMAGE (HCC): Status: ACTIVE | Noted: 2021-01-01

## 2021-12-17 NOTE — PROGRESS NOTES
Physician Progress Note      Alisa Raya  CSN #:                  [id number]  :                       1950  ADMIT DATE:       2021 10:43 AM  DISCH DATE:  RESPONDING  PROVIDER #:        Driss Rodrigues MD          QUERY TEXT:    Pt admitted with cardiac arrest and has respiratory failure with hypoxia documented. If possible, please document in progress notes and discharge summary further specificity regarding the type and acuity of respiratory failure: The medical record reflects the following:    Risk Factors: Cardiac arrest, breast Cancer, Aspiration pneumonia    Clinical Indicators:  > ABG ph 7.26, pO2 378, HCO3 19.9  > Per Ed physical exam-  No spontaneous respiratory effort  > Cardiopulmonary arrest initially in PEA no pulmonary embolus    Treatment: Receiving full ventilatory support, ABGs, Pulmonary consult    Thank you,  Freddie Simons RN, BSN, [place]  [phone]  Options provided:  -- Acute respiratory failure with hypoxia  -- Other - I will add my own diagnosis  -- Disagree - Not applicable / Not valid  -- Disagree - Clinically unable to determine / Unknown  -- Refer to Clinical Documentation Reviewer    PROVIDER RESPONSE TEXT:    This patient is in acute respiratory failure with hypoxia.     Query created by: Rigoberto Gill on 2021 9:44 AM      Electronically signed by:  Driss Rodrigues MD 2021 6:00 PM

## 2021-12-17 NOTE — PROGRESS NOTES
[facility] Zosyn (Piperacillin/Tazobactam) Extended Infusion    Selma Aguilera, a 73 y.o. yo female, has been converted to an extended infusion of Zosyn while in the intensive care unit. A loading dose of 3.375 or 4.5 gm will be given over 30 minutes depending on indication. Extended infusions will begin 4 hours after the initial dose if CrCl  >/= 20 ml/min or 8 hours after the initial dose if CrCl < 20 ml/min. Extended infusions will run over 4 hours (240 minutes).     Recent Labs     12/17/21  0742 12/17/21  0150 12/16/21  1630   CREA 3.21* 2.94* 1.89*     Ht Readings from Last 1 Encounters:   12/16/21 157.5 cm (62\")     Wt Readings from Last 1 Encounters:   12/16/21 77.1 kg (170 lb)       CrCl : Serum creatinine: 3.21 mg/dL (H) 12/17/21 0742  Estimated creatinine clearance: 15.9 mL/min (A)    Renal adjustment of extended infusion of Zosyn  3.375 every 12 hours for CrCl < 20 ml/min, intermittent HD or PD

## 2021-12-17 NOTE — PROCEDURES
ELECTROENCEPHALOGRAPHY     Patient: Amanda Wilcox MRN: [id number]  CSN: [id number]    YOB: 1950  Age: 73 y.o. Sex: female    DOA: 12/16/2021 LOS:  LOS: 1 day        Date of Service: 12/17/2021    DICTATING: Lanie Alvarez MD     REFERRING PHYSICIAN: Dr. Dave Sumner: 21-86    HISTORY OF PRESENT ILLNESS: This is a 73 y.o. female with history of congestive heart failure, atrial fibrillation, pacemaker placement, breast cancer, who presented with PEA arrest. She is comatose. ELECTROENCEPHALOGRAM INTERPRETATION: This is a referential and bipolar EEG recorded with a 10-20 system. EEG shows remarkable movement, myogenic and sudomotor activity. A regular electroencephalographic background can not be appreciated. Photic stimulation does not produce an abnormal activity. IMPRESSION: This EEG is inconclusive due to remarkable movement and sudomotor artifacts. Cortical activity can not be assessed accurately for normalcy or abnormalcy.         Signed:  Lanie Alvarez MD  12/17/2021  3:19 PM

## 2021-12-17 NOTE — PROGRESS NOTES
Reason for Admission:  Cardiac arrest     Copy of Hospitalist H&P:            Jovon Thomas is a 73 y.o. female with metastatic breast cancer stage IV, hypertension, asthma, PAF on Eliquis at home presents to ER status post cardiac arrest.  Patient is orally intubated unresponsive history obtained from chart review. She was found unresponsive by family member EMS was called she was found to be in PEA arrest.  ACLS protocol followed including  epinephrine, amiodarone and bicarbonate. She was intubated on field. She was being brought to the hospital, she lost pulse again before arrival to hospital requiring additional epinephrine. When she arrived to the ER she was found pulseless without any spontaneous respiration. CPR again initiated with ACLS protocol she received multiple rounds of epinephrine, bicarb, calcium. Per ER note ROSC achieved after about 26 minutes of downtime. Approximate total downtime roughly 40 minutes. She has not required sedation        RUR Score:   5                  Plan for utilizing home health:     TBD     PCP: First and Last name:  Loretta Other, MD     Name of Practice:    Are you a current patient: Yes/No:    Approximate date of last visit:    Can you participate in a virtual visit with your PCP:                     Current Advanced Directive/Advance Care Plan: Partial Code      Healthcare Decision Maker:   Click here to complete [id number] including selection of the Healthcare Decision Maker Relationship (ie \"Primary\")                             Transition of Care Plan:    Chart reviewed, at this time transition of care undetermined, neurology consulted with impression of possible anoxic brain injury, plan at this time is to repeat head CT and obtain EEG, cm will cont to follow case, at this time D/C planning on hold until further medical management. Care Management Interventions  PCP Verified by CM:  Yes  Palliative Care Criteria Met (RRAT>21 & CHF Dx)?: No  Support Systems: Spouse/Significant Other  Confirm Follow Up Transport: Family

## 2021-12-17 NOTE — CONSULTS
Pulmonary Specialists  Pulmonary, Critical Care, and Sleep Medicine    Name: Darylene Bandy MRN: [id number]   : 1950 Hospital: [facility] [place]    Date: 2021  Room: [address] [place] [address] Note                                              Consult requesting physician: Dr. Valerio Schwarz  Reason for Consult: cardiopulmonary  Arrest, hypoxemic respiratory failure, unresponsiveness, metastatic breast cancer, renal failure, metabolic acidosis      IMPRESSIOn   · Cardio pulmonary arrest  · Respiratory failure  · Shock  · Metastatic breast cancer  · Hyperglycemia  · Metabolic acidosis  · Congestive heart failure  Patient Active Problem List   Diagnosis Code    Shock ([address] R57.9    Cardiac arrest ([address] I46.9    Respiratory failure ([address] J96.90    Breast cancer ([facility] C50.919    SAUNDRA (acute kidney injury) ([facility] [facility] N17.9    LFT elevation R79.89    Acute hyperglycemia R73.9    Hypokalemia E87.6    PAF (paroxysmal atrial fibrillation) ([facility]) I48.0    Asthma J45.909         PMH I reviewed chart from  [facility]   Past Medical History:   Diagnosis Date    Aneurysm (CMS/HCC)    Breast cancer (CMS/HCC)    Cataract    Cholelithiasis 3/24/2009    Chronic kidney disease (CKD) stage G3a/A1, moderately decreased glomerular filtration rate (GFR) between 45-59 mL/min/1.73 square meter and albuminuria creatinine ratio less than 30 mg/g (CMS/HCC) 10/17/2021    Chronic systolic heart failure (CMS/HCC) 3/24/2009    Dilated cardiomyopathy (CMS/HCC) 5/15/2017    Gastroesophageal reflux disease with esophagitis 2012    Malignant neoplasm of breast (CMS/HCC) 3/24/2009    Malignant neoplasm of upper outer quadrant of breast in female, estrogen receptor negative (CMS/HCC) 2021    Malignant neoplasm of upper outer quadrant of breast in female, estrogen receptor negative (CMS/HCC) 2021    Neuropathy (CMS/HCC)    Paroxysmal atrial fibrillation (CMS/HCC) 2016    Paroxysmal ventricular tachycardia (CMS/HCC) 9/14/2009    Presence of cardiac pacemaker 9/14/2009   NO Device it was explanted however she does have leads that are capped off in place    Stroke (cerebrum) (CMS/HCC)   2018    Thrombocytopenia (CMS/[facility]) 7/27/2017         Code status: full code   Recent visit with palliative care at [place] evaluated  Stopped all tx for cancer stage 4    RECOMMENDATIONS:    Respiratory:   Cardiopulmonary arrest initially in PEA no pulmonary embolus. Multifactorial has metastatic breast cancer cardiomyopathy. Continue full ventilatory support. ABG 7.40/29/144/99  /27/80%/peep 5  CT of the chest evaluated. No PE aspiration pneumonia possible infiltrate  abx  and bronchodilators ordered    Keep SPO2 >=92%. HOB 30 degree elevation all the time. Aggressive pulmonary toileting. Aspiration precautions. Incentive spirometry. CVS: cardiopulmonary arrest history of cardiomyopathy and pacemaker. 12/16   Left Ventricle: Left ventricle is mildly dilated. Normal wall thickness. See diagram for wall motion findings. Severely reduced left ventricular systolic function with a visually estimated EF of 15 - 20%.   Right Ventricle: Not well visualized. Right ventricle is mildly dilated. Moderately reduced systolic function.   Left Atrium: Left atrium is mildly dilated.   Pulmonary Artery : Estimated Pulmonary Systolic Artery is 33 mmhg. Pulmonary hypertension found to be mild.   Patient is ventilated, cannot use IVC diameter to estimate right atrial pressure. IVC size is normal.       On Eliquis and Lasix and digoxin at home suspect has low ejection fraction? Echo pending. Status post cardiac arrest over 30 minutes. Continue epi continue Levophed if worsen add vasopressin. Continue Albumin's.   IV fluid prn      ID: WBC normal, no fever  Culture negative     Sepsis less likely shock most likely due to cardiopulmonary arrest from primary cardiac issues and complications of breast cancer  Panculture. Normal White blood cells patient is off all chemotherapy. Broad-spectrum antibiotics vancomycin and Zosyn stop tomoorw if no MRSA vanc    Sepsis bundle and protocol followed. Follow serial lactic acid, frequent BMP check, fluid resuscitation. Follow cultures. Deescalate antibiotic when appropriate. Hematology/Oncology: Stage IV breast cancer recently stopped chemotherapy due to ineffective treatment was talking to palliative care. We will consult oncology and palliative care. Monitor hemoglobin white blood cells and coagulation profile patient was on Eliquis at home no PE. Post prolonged cardiac arrest will be in high risk of bleeding into chest post prolonged CPR will start low-dose Lovenox and reevaluate    Renal:  Worsening renal failure due to cardiac arrest not a good candidate for HD  Unresponsive suspect anoxia    Acute on chronic renal failure with severe hyperkalemia, hyperglycemia, electrolyte derangement, dehydration, metabolic acidosis. Replace potassium and start insulin drip after. Continue hydration. Electrolyte replacement protocol per ICU placed start. Monitor kidney function. GI/: Shock liver no acute abnormalities on CT of the abdomen and pelvis no perforation    Endocrine: Monitor BS. SSI. Acute hyperglycemia possible DKA anion gap mildly elevated. Follow. Start insulin drip after replacement of potassium check magnesium phosphorus    Neurology: anoxic brain injury post prolonged CPR   Off sedation pupils minimally reactive, no cornela, no gag  RR above the evnt     CT of the head evaluated no acute CVA or bleeding. Unresponsive post prolonged cardiac arrest pupils nonreactive 3 mm  I suspect. No sedation given I suspect anoxic brain injury.   If uncomfortable or unable to ventilate we will start as needed low-dose of Versed and fentanyl        Toxicology: None    Pain/Sedation: RN fentanyl for pain    Skin/Wound: none    Electrolytes: Replace electrolytes per ICU electrolyte replacement protocol. IVF: DKA protocol    Nutrition: P.o. Prophylaxis: DVT Prophylaxis: SCD/Lovenox if no bleeding will resume Eliquis GI Prophylaxis:     Restraints: none  Responsive after cardiac arrest    Lines/Tubes: PIV  ETT: In place  OGT/NGT: Place once stable  Patient has port we are using it for pressors    Infante: Place Infante (Medically necessary for strict input/output monitoring in critically ill patient, will remove it when not needed. Infante bundle followed). Other:    PT/OT eval and treat. OOB. Advance Directive/Palliative Care: DNR changed on 17/17 considering comfort care  I consulted palliative care and evaluated recent on December 10 palliative care consultation patient did not want any extreme measurement but she did not sign officially DNR/DNI or declared hospice. So was in the gray zone. Code intubated CPR prolonged. Prognosis extremely poor I had long discussion with sister she is going to discuss it with family. I do not recommend to repeat CPR but final decision from family pending    Will defer respective systems problem management to primary and other respective consultant and follow patient in ICU with primary and other medical team.  Further recommendations will be based on the patient's response to recommended treatment and results of the investigation ordered. Quality Care: PPI, DVT prophylaxis, HOB elevated, Infection control all reviewed and addressed. Care of plan d/w RN, RT, MDR, hospitalist team.    High complexity decision making was performed during the evaluation of this patient at high risk for decompensation with multiple organ involvement. Total critical care time spent rendering care exclusive of procedures/family discussion/coordination of care: 48 minutes.     Discussed with family, neurology, palliative care   Subjective/History of Present Illness:     Patient is a 73 y.o. female with PMHx significant for congestive heart failure, atrial fibrillation, pacemaker, breast  cancer stage IV recurrence of the disease [place] of treatment seeing palliative care CODE STATUS was not clarified in the chart, on anticoagulation with Eliquis. Patient was recently seen by palliative care all evaluated note she said she does not want any extreme measures, however she was not declared fully DNR/DNI on hospice. Based on the last note. Patient was unwell and she had advanced breast cancer with recurrence of the disease that started 27 years ago, recently stopped all the treatment as it was ineffective, patient had cardiac arrest at home. EMS on arrival started ACLS protocol for PEA arrest.  Epi was given. Also V. fib reported 3 times defibrillated at home. ROSC achieved en route. Cardiac arrest again in ambulance given bicarbonate amiodarone Narcan. Blood sugar was stable 135. Patient was intubated. On arrival to emergency room still chest compressions ongoing. No pulse. Another 25 minutes of CPR in the emergency room. Return of pulse. Patient is currently on 2 of epi, Levophed 8, intubated, blood pressure systolic 917O, unresponsive with no sedation. Pupils no response. No spontaneous movement. CT of the head evaluated CTA of the chest no PE CT of the abdomen no acute GI issues. Known metastatic breast cancer to lungs. I met with sister at the bedside who is healthcare proxy. Continue current care. I have consulted palliative care. Family will discuss further steps    12/17/2021:  She remains in intensive care unit in bed 9. Intubated not on sedation overnight got low-dose of Versed now off. No fentanyl. Unresponsive not did not respond to painful stimuli but respiratory rate above the vent so not brain dead. Neurology consulted. Family at the bedside. Palliative care involved. Currently no CPR. Cyndy Le considering comfort care  I/O last 24 hrs:      Intake/Output Summary (Last 24 hours) at 12/17/2021 1005  Last data filed at 12/17/2021 0800  Gross per 24 hour   Intake 2439.57 ml   Output 50 ml   Net 2389.57 ml         The patient is critically ill and can not provide additional history due to Unconsciousness    History taken from sister and EMR Ed     Review of Systems:    ROS not obtained due to patient factor. Allergies   Allergen Reactions    Latex Itching    Codeine Itching    Lisinopril Cough    Venlafaxine Other (comments)     confusion      Past Medical History:   Diagnosis Date    Asthma     HTN (hypertension)     Metastatic breast cancer (HCC)     PAF (paroxysmal atrial fibrillation) (HCC)       No past surgical history on file. Social History     Tobacco Use    Smoking status: Not on file    Smokeless tobacco: Not on file   Substance Use Topics    Alcohol use: Not on file      No family history on file.    Prior to Admission medications    Not on File     Current Facility-Administered Medications   Medication Dose Route Frequency    dextrose 5% and 0.9% NaCl infusion  25 mL/hr IntraVENous CONTINUOUS    NOREPINephrine (LEVOPHED) 8 mg in 0.9% NS 250ml infusion  0.5-30 mcg/min IntraVENous TITRATE    0.9% sodium chloride infusion  25 mL/hr IntraVENous CONTINUOUS    pantoprazole (PROTONIX) 40 mg in 0.9% sodium chloride 10 mL injection  40 mg IntraVENous DAILY    sodium bicarbonate (8.4%) 150 mEq in dextrose 5% 1,000 mL infusion   IntraVENous CONTINUOUS    piperacillin-tazobactam (ZOSYN) 3.375 g in 0.9% sodium chloride (MBP/ADV) 100 mL MBP  3.375 g IntraVENous Q6H    enoxaparin (LOVENOX) injection 40 mg  40 mg SubCUTAneous Q24H    vancomycin (VANCOCIN) 1,000 mg in 0.9% sodium chloride 250 mL (VIAL-MATE)  1,000 mg IntraVENous Q24H    Vancomycin - Rx to dose and monitor  1 Each Other Rx Dosing/Monitoring    albumin human 25% (BUMINATE) solution 25 g  25 g IntraVENous Q6H         Objective:   Vital Signs:    Visit Vitals  BP (!) 112/59   Pulse 91   Temp 98.5 °F (36.9 °C)   Resp (!) 33   Ht 5' 2\" (1.575 m)   Wt 77.1 kg (170 lb)   SpO2 100%   BMI 31.09 kg/m²               Temp (24hrs), Av.6 °F (36.4 °C), Min:94.3 °F (34.6 °C), Max:98.5 °F (36.9 °C)       Intake/Output:   Last shift:      701 - 1900  In: 642.7 [I.V.:642.7]  Out: -     Last 3 shifts: 12/15 1901 -  07  In: 1796.9 [I.V.:1796.9]  Out: 50 [Urine:50]      Intake/Output Summary (Last 24 hours) at 2021 1005  Last data filed at 2021 0800  Gross per 24 hour   Intake 2439.57 ml   Output 50 ml   Net 2389.57 ml       Last 3 Recorded Weights in this Encounter    21 1058 21 1521   Weight: 77.1 kg (170 lb) 77.1 kg (170 lb)           Mode Rate Tidal Volume Pressure FiO2 PEEP   Assist control,VC+   400 ml    50 % 5 cm H20     Peak airway pressure: 22 cm H2O    Plateau pressure:     Tidal volume:    Minute ventilation: 12.7 l/min     Recent Labs     21  0447 21  1627 21  1123   PHI 7.40  --  7.26*   PCO2I 29.0*  --  44.9   PO2I 144*  --  378*   HCO3I 17.9* 26.3* 19.9*   FIO2  --   --  100   FIO2I 80  --   --        Physical Exam:     General/Neurology: intubated  nonsedated unresponsive pupil nonreactive, respiratory rate above the vent, withdrawal to painful stimuli, chronically  ill-appearing  Head:   Normocephalic, without obvious abnormality, atraumatic. Eye:   EOM intact. No scleral icterus, no pallor, no cyanosis. Nose:   No sinus tenderness  Throat:  Lips, mucosa, and tongue normal. No oral thrush. Neck:   Supple, symmetric. No lymphadenopathy. Trachea midline  Lung: Moderate air entry bilateral equal bila  No rales at the bases  No rhonchi. No wheezing. No stridors. No prolongded expiration. No accessory muscle use. Heart:   Regular rate & rhythm. S1 S2 present. No murmur. No JVD. Abdomen:  Soft. NT. ND. +BS. No masses. Extremities:  Nopedal edema. No cyanosis. No clubbing. Pulses: 2+ and symmetric in DP.  Capillary refill: normal  Lymphatic:  No cervical or supraclavicular palpable lymphadenopathy. Musculoskeletal: No joint swelling. No tenderness. Skin:   Color, texture, turgor normal. No rashes or lesions. Data:       Recent Results (from the past 24 hour(s))   BLOOD GAS,CHEM8,LACTIC ACID POC    Collection Time: 12/16/21 11:07 AM   Result Value Ref Range    Calcium, ionized (POC) 1.20 1. 12 - 1.32 mmol/L    Base deficit (POC) 2.3 mmol/L    HCO3 (POC) 29.3 (H) 22 - 26 MMOL/L    CO2, POC 31 (H) 19 - 24 MMOL/L    O2 SAT 55 %    Sample source VENOUS BLOOD      Performed by Francisco Rdz     Sodium (POC) 145 136 - 145 mmol/L    Potassium (POC) 3.4 (L) 3.5 - 5.1 mmol/L    Glucose (POC) 259 (H) 65 - 100 mg/dL    Creatinine (POC) 1.27 0.6 - 1.3 mg/dL    Lactic Acid (POC) 16.06 (HH) 0.40 - 2.00 mmol/L    Chloride (POC) 102 98 - 107 mmol/L    Anion gap, POC 13 10 - 20      GFRAA, POC 51 (L) >60 ml/min/1.73m2    GFRNA, POC 42 (L) >60 ml/min/1.73m2    pH, venous (POC) 7.14 (LL) 7.32 - 7.42      pCO2, venous (POC) 85.2 (H) 41 - 51 MMHG    pO2, venous (POC) 39 25 - 40 mmHg   EKG, 12 LEAD, INITIAL    Collection Time: 12/16/21 11:07 AM   Result Value Ref Range    Ventricular Rate 63 BPM    QRS Duration 136 ms    Q-T Interval 562 ms    QTC Calculation (Bezet) 575 ms    Calculated R Axis -78 degrees    Calculated T Axis 111 degrees    Diagnosis       Wide QRS rhythm  Left axis deviation  Right bundle branch block  Inferior infarct , age undetermined  Abnormal ECG  When compared with ECG of 29-OCT-2009 09:01,  Wide QRS rhythm has replaced Sinus rhythm  Confirmed by Romaine Ochoa MD, -- ([id number]) on 12/16/2021 3:36:54 PM     CBC WITH AUTOMATED DIFF    Collection Time: 12/16/21 11:09 AM   Result Value Ref Range    WBC 5.5 4.6 - 13.2 K/uL    RBC 3.89 (L) 4.20 - 5.30 M/uL    HGB 11.6 (L) 12.0 - 16.0 g/dL    HCT 40.4 35.0 - 45.0 %    .9 (H) 78.0 - 100.0 FL    MCH 29.8 24.0 - 34.0 PG    MCHC 28.7 (L) 31.0 - 37.0 g/dL    RDW 19.4 (H) 11.6 - 14.5 %    PLATELET 414 819 - 254 K/uL    MPV 10.4 9.2 - 11.8 FL    NRBC 0.5 (H) 0  WBC    ABSOLUTE NRBC 0.03 (H) 0.00 - 0.01 K/uL    NEUTROPHILS 26 (L) 40 - 73 %    BAND NEUTROPHILS 4 0 - 5 %    LYMPHOCYTES 64 (H) 21 - 52 %    MONOCYTES 2 (L) 3 - 10 %    EOSINOPHILS 2 0 - 5 %    BASOPHILS 0 0 - 2 %    METAMYELOCYTES 1 (H) 0 %    MYELOCYTES 1 (H) 0 %    IMMATURE GRANULOCYTES 0 %    ABS. NEUTROPHILS 1.7 (L) 1.8 - 8.0 K/UL    ABS. LYMPHOCYTES 3.5 0.9 - 3.6 K/UL    ABS. MONOCYTES 0.1 0.05 - 1.2 K/UL    ABS. EOSINOPHILS 0.1 0.0 - 0.4 K/UL    ABS. BASOPHILS 0.0 0.0 - 0.1 K/UL    ABS. IMM.  GRANS. 0.0 K/UL    DF MANUAL      RBC COMMENTS MACROCYTOSIS  2+        RBC COMMENTS POLYCHROMASIA  2+        RBC COMMENTS HYPOCHROMIA  1+        RBC COMMENTS SCHISTOCYTES  2+        RBC COMMENTS SPHEROCYTES  FEW       TYPE & SCREEN    Collection Time: 12/16/21 11:09 AM   Result Value Ref Range    Crossmatch Expiration 12/19/2021,2359     ABO/Rh(D) Verl Simper POSITIVE     Antibody screen NEG    BLOOD GAS,CHEM8,LACTIC ACID POC    Collection Time: 12/16/21 11:23 AM   Result Value Ref Range    pH (POC) 7.26 (L) 7.35 - 7.45      pCO2 (POC) 44.9 35.0 - 45.0 MMHG    pO2 (POC) 378 (H) 80 - 100 MMHG    Calcium, ionized (POC) 1.28 1.12 - 1.32 mmol/L    Base deficit (POC) 7.1 mmol/L    HCO3 (POC) 19.9 (L) 22 - 26 MMOL/L    CO2, POC 21 19 - 24 MMOL/L    O2  %    Sample source ARTERIAL      SITE RIGHT RADIAL      STEPHIE'S TEST Positive      Device: ADULT VENT      FIO2 100 %    Tidal volume 400      PEEP/CPAP 6      Performed by Dianelys Coleman RESP     Sodium (POC) 143 136 - 145 mmol/L    Potassium (POC) 3.1 (L) 3.5 - 5.1 mmol/L    Glucose (POC) 238 (H) 65 - 100 mg/dL    Creatinine (POC) 1.27 0.6 - 1.3 mg/dL    Lactic Acid (POC) 16.06 (HH) 0.40 - 2.00 mmol/L    Chloride (POC) 99 98 - 107 mmol/L    Anion gap, POC 24 (H) 10 - 20      GFRAA, POC 51 (L) >60 ml/min/1.73m2    GFRNA, POC 42 (L) >60 ml/min/1.73m2   URINALYSIS W/ RFLX MICROSCOPIC    Collection Time: 12/16/21 12:50 PM   Result Value Ref Range    Color DARK YELLOW      Appearance CLEAR      Specific gravity 1.017 1.005 - 1.030      pH (UA) 5.5 5.0 - 8.0      Protein TRACE (A) NEG mg/dL    Glucose Negative NEG mg/dL    Ketone Negative NEG mg/dL    Bilirubin Negative NEG      Blood Negative NEG      Urobilinogen 1.0 0.2 - 1.0 EU/dL    Nitrites Negative NEG      Leukocyte Esterase Negative NEG     TROPONIN-HIGH SENSITIVITY    Collection Time: 12/16/21 12:50 PM   Result Value Ref Range    Troponin-High Sensitivity 6,834 (HH) 0 - 54 ng/L   CKMB PROFILE    Collection Time: 12/16/21 12:50 PM   Result Value Ref Range     (H) 26 - 192 U/L    CK - MB 26.8 (H) <3.6 ng/ml    CK-MB Index 9.1 (H) 0.0 - 4.0 %   METABOLIC PANEL, COMPREHENSIVE    Collection Time: 12/16/21 12:50 PM   Result Value Ref Range    Sodium 134 (L) 136 - 145 mmol/L    Potassium 2.5 (LL) 3.5 - 5.5 mmol/L    Chloride 91 (L) 100 - 111 mmol/L    CO2 25 21 - 32 mmol/L    Anion gap 18 3.0 - 18 mmol/L    Glucose 533 (HH) 74 - 99 mg/dL    BUN 13 7.0 - 18 MG/DL    Creatinine 1.55 (H) 0.6 - 1.3 MG/DL    BUN/Creatinine ratio 8 (L) 12 - 20      GFR est AA 40 (L) >60 ml/min/1.73m2    GFR est non-AA 33 (L) >60 ml/min/1.73m2    Calcium 9.0 8.5 - 10.1 MG/DL    Bilirubin, total 1.3 (H) 0.2 - 1.0 MG/DL    ALT (SGPT) 89 (H) 13 - 56 U/L    AST (SGOT) 231 (H) 10 - 38 U/L    Alk.  phosphatase 135 (H) 45 - 117 U/L    Protein, total 5.7 (L) 6.4 - 8.2 g/dL    Albumin 2.3 (L) 3.4 - 5.0 g/dL    Globulin 3.4 2.0 - 4.0 g/dL    A-G Ratio 0.7 (L) 0.8 - 1.7     URINE MICROSCOPIC ONLY    Collection Time: 12/16/21 12:50 PM   Result Value Ref Range    WBC 0 to 3 0 - 5 /hpf    RBC 0 to 3 0 - 5 /hpf    Epithelial cells 1+ 0 - 5 /lpf    Bacteria FEW (A) NEG /hpf    Mucus FEW (A) NEG /lpf    CA Oxalate crystals FEW (A) NEG     NT-PRO BNP    Collection Time: 12/16/21 12:50 PM   Result Value Ref Range    NT pro-BNP 4,672 (H) 0 - 900 PG/ML   MAGNESIUM    Collection Time: 12/16/21 12:50 PM   Result Value Ref Range    Magnesium 2.1 1.6 - 2.6 mg/dL   ECHO ADULT COMPLETE    Collection Time: 12/16/21  3:25 PM   Result Value Ref Range    IVSd 0.7 0.6 - 0.9 cm    LVIDd 5.7 (A) 3.9 - 5.3 cm    LVIDs 5.2 cm    LVOT Diameter 2.0 cm    LVPWd 0.7 0.6 - 0.9 cm    EF BP 23 (A) 55 - 100 %    LV Ejection Fraction A2C 17 %    LV Ejection Fraction A4C 27 %    LV EDV A2C 153 mL    LV EDV A4C 116 mL    LV EDV  (A) 56 - 104 mL    LV ESV A2C 127 mL    LV ESV A4C 85 mL    LV ESV  (A) 19 - 49 mL    RVIDd 3.5 cm    RVSP 33 mmHg    RV Free Wall Peak S' 9 cm/s    LA Diameter 3.4 cm    LA Volume A/L 55 mL    LA Volume 2C 63 (A) 22 - 52 mL    LA Volume 4C 38 22 - 52 mL    LA Volume BP 51 22 - 52 mL    Est. RA Pressure 3 mmHg    LV E' Lateral Velocity 5 cm/s    LV E' Septal Velocity 5 cm/s    Pulmonary Artery EDP 20 mmHg    CA Max Velocity 2.2 m/s    TAPSE 1.1 (A) 1.5 - 2.0 cm    TR Peak Gradient 30 mmHg    TR Max Velocity 2.73 m/s    Aortic Root 2.7 cm    Fractional Shortening 2D 9 28 - 44 %    LV ESV Index BP 60 mL/m2    LV ESV Index A4C 48 mL/m2    LV EDV Index A4C 65 mL/m2    LV ESV Index A2C 71 mL/m2    LV EDV Index A2C 86 mL/m2    LVIDd Index 3.20 cm/m2    LVIDs Index 2.92 cm/m2    LV RWT Ratio 0.25     LV Mass 2D 144.3 67 - 162 g    LV Mass 2D Index 81.1 43 - 95 g/m2    LA Volume Index A/L 31 mL/m2    LVOT Area 3.1 cm2    LA Volume Index 2C 35 (A) 16 - 28 mL/m2    LA Volume Index 4C 21 16 - 28 mL/m2    LA Size Index 1.91 cm/m2    LA/AO Root Ratio 1.26     Ao Root Index 1.52 cm/m2    LV EDV Index BP 78 mL/m2    MV E Velocity 0.91 m/s    E/E' Ratio (Averaged) 18.20     E/E' Lateral 18.20     E/E' Septal 18.20     LA Volume Index BP 29 (A) 16 - 28 ml/m2   GLUCOSE, POC    Collection Time: 12/16/21  3:29 PM   Result Value Ref Range    Glucose (POC) 218 (H) 70 - 110 mg/dL   BLOOD GAS,CHEM8,LACTIC ACID POC    Collection Time: 12/16/21  4:27 PM   Result Value Ref Range    Calcium, ionized (POC) 1.11 (L) 1.12 - 1.32 mmol/L    Base deficit (POC) 3.2 mmol/L HCO3 (POC) 26.3 (H) 22 - 26 MMOL/L    CO2, POC 28 (H) 19 - 24 MMOL/L    O2 SAT 65 %    Sample source VENOUS BLOOD      Performed by Tray Galvan     Sodium (POC) 144 136 - 145 mmol/L    Potassium (POC) 2.4 (LL) 3.5 - 5.1 mmol/L    Glucose (POC) 199 (H) 65 - 100 mg/dL    Creatinine (POC) 1.52 (H) 0.6 - 1.3 mg/dL    Lactic Acid (POC) 14.41 (HH) 0.40 - 2.00 mmol/L    Chloride (POC) 98 98 - 107 mmol/L    Anion gap, POC 20 10 - 20      GFRAA, POC 41 (L) >60 ml/min/1.73m2    GFRNA, POC 34 (L) >60 ml/min/1.73m2    pH, venous (POC) 7.21 (L) 7.32 - 7.42      pCO2, venous (POC) 65.1 (H) 41 - 51 MMHG    pO2, venous (POC) 42 (H) 25 - 40 mmHg   PROTHROMBIN TIME + INR    Collection Time: 12/16/21  4:30 PM   Result Value Ref Range    Prothrombin time 21.4 (H) 11.5 - 15.2 sec    INR 2.0 (H) 0.8 - 1.2     HEMOGLOBIN A1C WITH EAG    Collection Time: 12/16/21  4:30 PM   Result Value Ref Range    Hemoglobin A1c 5.3 4.2 - 5.6 %    Est. average glucose 105 mg/dL   RENAL FUNCTION PANEL    Collection Time: 12/16/21  4:30 PM   Result Value Ref Range    Sodium 142 136 - 145 mmol/L    Potassium 2.6 (LL) 3.5 - 5.5 mmol/L    Chloride 99 (L) 100 - 111 mmol/L    CO2 24 21 - 32 mmol/L    Anion gap 19 (H) 3.0 - 18 mmol/L    Glucose 194 (H) 74 - 99 mg/dL    BUN 17 7.0 - 18 MG/DL    Creatinine 1.89 (H) 0.6 - 1.3 MG/DL    BUN/Creatinine ratio 9 (L) 12 - 20      GFR est AA 32 (L) >60 ml/min/1.73m2    GFR est non-AA 26 (L) >60 ml/min/1.73m2    Calcium 9.5 8.5 - 10.1 MG/DL    Phosphorus 4.0 2.5 - 4.9 MG/DL    Albumin 2.7 (L) 3.4 - 5.0 g/dL   CBC WITH AUTOMATED DIFF    Collection Time: 12/16/21  4:30 PM   Result Value Ref Range    WBC 7.7 4.6 - 13.2 K/uL    RBC 4.52 4.20 - 5.30 M/uL    HGB 13.4 12.0 - 16.0 g/dL    HCT 46.0 (H) 35.0 - 45.0 %    .8 (H) 78.0 - 100.0 FL    MCH 29.6 24.0 - 34.0 PG    MCHC 29.1 (L) 31.0 - 37.0 g/dL    RDW 19.6 (H) 11.6 - 14.5 %    PLATELET 242 030 - 315 K/uL    MPV 10.0 9.2 - 11.8 FL    NRBC 0.0 0  WBC ABSOLUTE NRBC 0.00 0.00 - 0.01 K/uL    NEUTROPHILS 59 40 - 73 %    BAND NEUTROPHILS 17 (H) 0 - 5 %    LYMPHOCYTES 20 (L) 21 - 52 %    MONOCYTES 3 3 - 10 %    EOSINOPHILS 0 0 - 5 %    BASOPHILS 0 0 - 2 %    METAMYELOCYTES 1 (H) 0 %    IMMATURE GRANULOCYTES 0 %    ABS. NEUTROPHILS 5.9 1.8 - 8.0 K/UL    ABS. LYMPHOCYTES 1.5 0.9 - 3.6 K/UL    ABS. MONOCYTES 0.2 0.05 - 1.2 K/UL    ABS. EOSINOPHILS 0.0 0.0 - 0.4 K/UL    ABS. BASOPHILS 0.0 0.0 - 0.1 K/UL    ABS. IMM.  GRANS. 0.0 K/UL    DF MANUAL      RBC COMMENTS MACROCYTOSIS  1+        RBC COMMENTS ANISOCYTOSIS  1+       PHOSPHORUS    Collection Time: 12/16/21  4:31 PM   Result Value Ref Range    Phosphorus 3.6 2.5 - 4.9 MG/DL   ALBUMIN    Collection Time: 12/16/21  4:31 PM   Result Value Ref Range    Albumin 2.8 (L) 3.4 - 5.0 g/dL   COVID-19 RAPID TEST    Collection Time: 12/16/21  5:04 PM   Result Value Ref Range    Specimen source Nasopharyngeal      COVID-19 rapid test Not detected NOTD     GLUCOSE, POC    Collection Time: 12/16/21  8:06 PM   Result Value Ref Range    Glucose (POC) 71 70 - 110 mg/dL   MAGNESIUM    Collection Time: 12/16/21 10:42 PM   Result Value Ref Range    Magnesium 2.2 1.6 - 2.6 mg/dL   CKMB PROFILE    Collection Time: 12/16/21 10:42 PM   Result Value Ref Range    CK 1,457 (H) 26 - 192 U/L    CK - .3 (H) <3.6 ng/ml    CK-MB Index 7.2 (H) 0.0 - 4.0 %   TROPONIN-HIGH SENSITIVITY    Collection Time: 12/16/21 10:42 PM   Result Value Ref Range    Troponin-High Sensitivity 58,465 (HH) 0 - 54 ng/L   GLUCOSE, POC    Collection Time: 12/16/21 11:20 PM   Result Value Ref Range    Glucose (POC) 43 (LL) 70 - 110 mg/dL   GLUCOSE, POC    Collection Time: 12/16/21 11:41 PM   Result Value Ref Range    Glucose (POC) 140 (H) 70 - 756 mg/dL   METABOLIC PANEL, BASIC    Collection Time: 12/17/21  1:50 AM   Result Value Ref Range    Sodium 146 (H) 136 - 145 mmol/L    Potassium 2.7 (LL) 3.5 - 5.5 mmol/L    Chloride 103 100 - 111 mmol/L    CO2 18 (L) 21 - 32 mmol/L Anion gap 25 (H) 3.0 - 18 mmol/L    Glucose 82 74 - 99 mg/dL    BUN 26 (H) 7.0 - 18 MG/DL    Creatinine 2.94 (H) 0.6 - 1.3 MG/DL    BUN/Creatinine ratio 9 (L) 12 - 20      GFR est AA 19 (L) >60 ml/min/1.73m2    GFR est non-AA 16 (L) >60 ml/min/1.73m2    Calcium 8.6 8.5 - 10.1 MG/DL   MAGNESIUM    Collection Time: 12/17/21  1:50 AM   Result Value Ref Range    Magnesium 1.8 1.6 - 2.6 mg/dL   GLUCOSE, POC    Collection Time: 12/17/21  3:49 AM   Result Value Ref Range    Glucose (POC) 36 (LL) 70 - 110 mg/dL   GLUCOSE, POC    Collection Time: 12/17/21  3:51 AM   Result Value Ref Range    Glucose (POC) 40 (LL) 70 - 110 mg/dL   GLUCOSE, POC    Collection Time: 12/17/21  4:11 AM   Result Value Ref Range    Glucose (POC) 134 (H) 70 - 110 mg/dL   CBC WITH AUTOMATED DIFF    Collection Time: 12/17/21  4:15 AM   Result Value Ref Range    WBC 6.0 4.6 - 13.2 K/uL    RBC 3.22 (L) 4.20 - 5.30 M/uL    HGB 9.9 (L) 12.0 - 16.0 g/dL    HCT 32.9 (L) 35.0 - 45.0 %    .2 (H) 78.0 - 100.0 FL    MCH 30.7 24.0 - 34.0 PG    MCHC 30.1 (L) 31.0 - 37.0 g/dL    RDW 19.4 (H) 11.6 - 14.5 %    PLATELET 401 353 - 605 K/uL    MPV 10.4 9.2 - 11.8 FL    NRBC 2.0 (H) 0  WBC    ABSOLUTE NRBC 0.12 (H) 0.00 - 0.01 K/uL    NEUTROPHILS 39 (L) 40 - 73 %    BAND NEUTROPHILS 42 (H) 0 - 5 %    LYMPHOCYTES 14 (L) 21 - 52 %    MONOCYTES 3 3 - 10 %    EOSINOPHILS 0 0 - 5 %    BASOPHILS 0 0 - 2 %    METAMYELOCYTES 2 (H) 0 %    IMMATURE GRANULOCYTES 0 %    ABS. NEUTROPHILS 4.9 1.8 - 8.0 K/UL    ABS. LYMPHOCYTES 0.8 (L) 0.9 - 3.6 K/UL    ABS. MONOCYTES 0.2 0.05 - 1.2 K/UL    ABS. EOSINOPHILS 0.0 0.0 - 0.4 K/UL    ABS. BASOPHILS 0.0 0.0 - 0.1 K/UL    ABS. IMM.  GRANS. 0.0 K/UL    DF MANUAL      PLATELET COMMENTS ADEQUATE PLATELETS      RBC COMMENTS MACROCYTOSIS  1+        RBC COMMENTS ANISOCYTOSIS  1+        RBC COMMENTS POLYCHROMASIA  1+       CARDIAC PANEL,(CK, CKMB & TROPONIN)    Collection Time: 12/17/21  4:15 AM   Result Value Ref Range    CK - MB ng/ml     Original order canceled by laboratory, test reordered by laboratory with the purpose of combining it with other laboratory orders. CK-MB Index  %     Original order canceled by laboratory, test reordered by laboratory with the purpose of combining it with other laboratory orders. CK  U/L     Original order canceled by laboratory, test reordered by laboratory with the purpose of combining it with other laboratory orders. Troponin-High Sensitivity 57,953 (HH) 0 - 54 ng/L   BLOOD GAS, ARTERIAL POC    Collection Time: 12/17/21  4:47 AM   Result Value Ref Range    Device: ADULT VENT      FIO2 (POC) 80 %    pH (POC) 7.40 7.35 - 7.45      pCO2 (POC) 29.0 (L) 35.0 - 45.0 MMHG    pO2 (POC) 144 (H) 80 - 100 MMHG    HCO3 (POC) 17.9 (L) 22 - 26 MMOL/L    sO2 (POC) 99.3 (H) 92 - 97 %    Base deficit (POC) 5.9 mmol/L    Mode Volume Control      Tidal volume 400 ml    Set Rate 27 bpm    PEEP/CPAP (POC) 5 cmH2O    PIP (POC) 23      Allens test (POC) Positive      Site LEFT RADIAL      Patient temp.  98.5      Specimen type (POC) ARTERIAL      Performed by Pattricia Dye    CKMB PROFILE    Collection Time: 12/17/21  5:20 AM   Result Value Ref Range     (H) 26 - 192 U/L    CK - MB 30.8 (H) <3.6 ng/ml    CK-MB Index 3.2 0.0 - 4.0 %   GLUCOSE, POC    Collection Time: 12/17/21  6:55 AM   Result Value Ref Range    Glucose (POC) 68 (L) 70 - 110 mg/dL   GLUCOSE, POC    Collection Time: 12/17/21  7:04 AM   Result Value Ref Range    Glucose (POC) 66 (L) 70 - 110 mg/dL   GLUCOSE, POC    Collection Time: 12/17/21  7:22 AM   Result Value Ref Range    Glucose (POC) 145 (H) 70 - 110 mg/dL   RENAL FUNCTION PANEL    Collection Time: 12/17/21  7:42 AM   Result Value Ref Range    Sodium 144 136 - 145 mmol/L    Potassium 3.1 (L) 3.5 - 5.5 mmol/L    Chloride 101 100 - 111 mmol/L    CO2 19 (L) 21 - 32 mmol/L    Anion gap 24 (H) 3.0 - 18 mmol/L    Glucose 98 74 - 99 mg/dL    BUN 28 (H) 7.0 - 18 MG/DL    Creatinine 3.21 (H) 0.6 - 1.3 MG/DL    BUN/Creatinine ratio 9 (L) 12 - 20      GFR est AA 17 (L) >60 ml/min/1.73m2    GFR est non-AA 14 (L) >60 ml/min/1.73m2    Calcium 8.9 8.5 - 10.1 MG/DL    Phosphorus 2.0 (L) 2.5 - 4.9 MG/DL    Albumin 3.6 3.4 - 5.0 g/dL         Chemistry Recent Labs     12/17/21  0742 12/17/21  0150 12/16/21  2242 12/16/21  1631 12/16/21  1630 12/16/21  1250 12/16/21  1250   GLU 98 82  --   --  194*   < > 533*    146*  --   --  142   < > 134*   K 3.1* 2.7*  --   --  2.6*   < > 2.5*    103  --   --  99*   < > 91*   CO2 19* 18*  --   --  24   < > 25   BUN 28* 26*  --   --  17   < > 13   CREA 3.21* 2.94*  --   --  1.89*   < > 1.55*   CA 8.9 8.6  --   --  9.5   < > 9.0   MG  --  1.8 2.2  --   --   --  2.1   PHOS 2.0*  --   --  3.6 4.0  --   --    AGAP 24* 25*  --   --  19*   < > 18   BUCR 9* 9*  --   --  9*   < > 8*   AP  --   --   --   --   --   --  135*   TP  --   --   --   --   --   --  5.7*   ALB 3.6  --   --  2.8* 2.7*   < > 2.3*   GLOB  --   --   --   --   --   --  3.4   AGRAT  --   --   --   --   --   --  0.7*    < > = values in this interval not displayed. Lactic Acid No results found for: LAC  No results for input(s): LAC in the last 72 hours. Liver Enzymes Protein, total   Date Value Ref Range Status   12/16/2021 5.7 (L) 6.4 - 8.2 g/dL Final     Albumin   Date Value Ref Range Status   12/17/2021 3.6 3.4 - 5.0 g/dL Final     Globulin   Date Value Ref Range Status   12/16/2021 3.4 2.0 - 4.0 g/dL Final     A-G Ratio   Date Value Ref Range Status   12/16/2021 0.7 (L) 0.8 - 1.7   Final     Alk. phosphatase   Date Value Ref Range Status   12/16/2021 135 (H) 45 - 117 U/L Final     Recent Labs     12/17/21  0742 12/16/21  1631 12/16/21  1630 12/16/21  1250 12/16/21  1250   TP  --   --   --   --  5.7*   ALB 3.6 2.8* 2.7*   < > 2.3*   GLOB  --   --   --   --  3.4   AGRAT  --   --   --   --  0.7*   AP  --   --   --   --  135*    < > = values in this interval not displayed.         CBC w/Diff Recent Labs 12/17/21  0415 12/16/21  1630 12/16/21  1109   WBC 6.0 7.7 5.5   RBC 3.22* 4.52 3.89*   HGB 9.9* 13.4 11.6*   HCT 32.9* 46.0* 40.4    240 177   GRANS 39* 59 26*   LYMPH 14* 20* 64*   EOS 0 0 2        Cardiac Enzymes Lab Results   Component Value Date/Time     (H) 12/17/2021 05:20 AM    CPK  12/17/2021 04:15 AM     Original order canceled by laboratory, test reordered by laboratory with the purpose of combining it with other laboratory orders. CPK 1,457 (H) 12/16/2021 10:42 PM     (H) 12/16/2021 12:50 PM    CKMB 30.8 (H) 12/17/2021 05:20 AM    CKMB  12/17/2021 04:15 AM     Original order canceled by laboratory, test reordered by laboratory with the purpose of combining it with other laboratory orders. CKMB 105.3 (H) 12/16/2021 10:42 PM    CKMB 26.8 (H) 12/16/2021 12:50 PM    CKND1 3.2 12/17/2021 05:20 AM    CKND1  12/17/2021 04:15 AM     Original order canceled by laboratory, test reordered by laboratory with the purpose of combining it with other laboratory orders.     CKND1 7.2 (H) 12/16/2021 10:42 PM    CKND1 9.1 (H) 12/16/2021 12:50 PM        BNP No results found for: BNP, BNPP, XBNPT     Coagulation Recent Labs     12/16/21  1630   PTP 21.4*   INR 2.0*         Thyroid  Lab Results   Component Value Date/Time    TSH 1.00 10/30/2009 02:17 AM       No results found for: T4     Urinalysis Lab Results   Component Value Date/Time    Color DARK YELLOW 12/16/2021 12:50 PM    Appearance CLEAR 12/16/2021 12:50 PM    Specific gravity 1.017 12/16/2021 12:50 PM    pH (UA) 5.5 12/16/2021 12:50 PM    Protein TRACE (A) 12/16/2021 12:50 PM    Glucose Negative 12/16/2021 12:50 PM    Ketone Negative 12/16/2021 12:50 PM    Bilirubin Negative 12/16/2021 12:50 PM    Urobilinogen 1.0 12/16/2021 12:50 PM    Nitrites Negative 12/16/2021 12:50 PM    Leukocyte Esterase Negative 12/16/2021 12:50 PM    Epithelial cells 1+ 12/16/2021 12:50 PM    Bacteria FEW (A) 12/16/2021 12:50 PM    WBC 0 to 3 12/16/2021 12:50 PM RBC 0 to 3 12/16/2021 12:50 PM        Micro  No results for input(s): SDES, CULT in the last 72 hours. No results for input(s): CULT in the last 72 hours. Culture data during this hospitalization. All Micro Results     Procedure Component Value Units Date/Time    CULTURE, RESPIRATORY/SPUTUM/BRONCH Letitia Joseph [[id number]] Collected: 12/16/21 2040    Order Status: Completed Specimen: Sputum from Transtracheal Aspirate Updated: 12/16/21 2047    COVID-19 RAPID TEST [[id number]] Collected: 12/16/21 1704    Order Status: Completed Specimen: Nasopharyngeal Updated: 12/16/21 1750     Specimen source Nasopharyngeal        COVID-19 rapid test Not detected        Comment: Rapid Abbott ID Now       Rapid NAAT:  The specimen is NEGATIVE for SARS-CoV-2, the novel coronavirus associated with COVID-19. Negative results should be treated as presumptive and, if inconsistent with clinical signs and symptoms or necessary for patient management, should be tested with an alternative molecular assay. Negative results do not preclude SARS-CoV-2 infection and should not be used as the sole basis for patient management decisions. This test has been authorized by the FDA under an Emergency Use Authorization (EUA) for use by authorized laboratories. Fact sheet for Healthcare Providers: ConventionUpdate.co.nz  Fact sheet for Patients: ConventionUpdate.co.nz       Methodology: Isothermal Nucleic Acid Amplification         CULTURE, BLOOD [[id number]] Collected: 12/16/21 1630    Order Status: Completed Specimen: Blood Updated: 12/16/21 1723    CULTURE, BLOOD [[id number]] Collected: 12/16/21 1704    Order Status: Canceled Specimen: Blood     CULTURE, BLOOD [[id number]] Collected: 12/16/21 1445    Order Status: Canceled Specimen: Blood              CT HEAD WO CONT    Result Date: 12/16/2021  EXAM: CT head INDICATION: Cardiac arrest, metastatic breast cancer COMPARISON: None.  TECHNIQUE: Axial CT imaging of the head was performed without intravenous contrast. Standard multiplanar coronal and sagittal reformatted images were obtained and are included in interpretation. One or more dose reduction techniques were used on this CT: automated exposure control, adjustment of the mAs and/or kVp according to patient size, and iterative reconstruction techniques. The specific techniques used on this CT exam have been documented in the patient's electronic medical record. Digital Imaging and Communications in Medicine (DICOM) format image data are available to nonaffiliated external healthcare facilities or entities on a secure, media free, reciprocally searchable basis with patient authorization for at least a 12-month period after this study. _______________ FINDINGS: BRAIN AND POSTERIOR FOSSA: Mild cortical and cerebellar volume loss is present. The ventricular size and configuration is within normal limits. Basilar cisterns are patent. Mild periventricular white matter low-attenuation is present. There is no intracranial hemorrhage, mass effect, or midline shift. The gray-white matter differentiation is within normal limits. EXTRA-AXIAL SPACES AND MENINGES: There are no abnormal extra-axial fluid collections. CALVARIUM: Intact. SINUSES: Layering fluid present within the sphenoid sinus with mild mucosal thickening of the anterior and posterior ethmoid air cells. OTHER: Ocular lenses are surgically replaced _______________     1. No acute intracranial abnormality demonstrated. 2. Subcortical and periventricular white matter low-attenuation as can be seen with sequela of chronic ischemic microvascular change 3. Nonspecific paranasal mucosal thickening/fluid    CTA CHEST W OR W WO CONT    Result Date: 12/16/2021  EXAM: CTA Chest INDICATION: Cardiac arrest, patient with history of breast cancer COMPARISON: No prior study.  TECHNIQUE: Axial CT imaging from the thoracic inlet through the diaphragm with intravenous contrast utilizing CTA study for pulmonary artery evaluation. Coronal and sagittal MIP reformations were generated at a separate workstation. One or more dose reduction techniques were used on this CT: automated exposure control, adjustment of the mAs and/or kVp according to patient size, and iterative reconstruction techniques. The specific techniques used on this CT exam have been documented in the patient's electronic medical record. Digital Imaging and Communications in Medicine (DICOM) format image data are available to nonaffiliated external healthcare facilities or entities on a secure, media free, reciprocally searchable basis with patient authorization for at least a 12-month period after this study. _______________ FINDINGS: EXAM QUALITY: Overall exam quality is adequate. Pulmonary arterial enhancement is adequate. Although the exam is degraded by motion artifact, distal filter be diagnostic for the exclusion of pulmonary embolism. PULMONARY ARTERIES: No convincing evidence of pulmonary embolism. MEDIASTINUM: Cardiac size is mildly enlarged. Thoracic aorta is essentially unenhanced appearance but normal in course and caliber. No evidence of pericardial effusion. Right chest wall Mediport catheter with tip present in the SVC. LYMPH NODES: No enlarged mediastinal or hilar nodes by size criteria. AIRWAY: Endotracheal tube appropriately positioned. LUNGS: Multifocal areas of consolidation are demonstrated, greatest in conspicuity within the right upper lobe, both superiorly and posteriorly, throughout the right middle lobe, and medial portions of the lower lobes bilaterally, right greater than left. PLEURA: No pneumothorax. Small/trace bilateral pleural effusions. UPPER ABDOMEN: Partial inclusion nasogastric tube within the stomach. Reflux of contrast into the infrahepatic IVC and hepatic veins. . OTHER: There are multiple rib fractures present:   > On the right, there are moderately displaced anterolateral fractures of the third, fourth, fifth, sixth, seventh, eighth, and ninth ribs.   > On the left there are mildly displaced fractures of the left third, fourth, fifth ribs. Vertebral body heights are normal. No compression fracture. Partial inclusion right breast prosthesis. _______________     1. No evidence of pulmonary embolism. 2. Multifocal areas of alveolar consolidation, greatest across the right upper, middle, and lower lobes as described above, potentially pneumonitis related to aspiration given the distribution. 3. Small bilateral pleural effusions without pneumothorax. 4. Multiple bilateral rib fractures as described in detail above. CT ABD PELV W CONT    Result Date: 12/16/2021  EXAM: CT of the abdomen and pelvis INDICATION: Abdominal pain, cardiac arrest, breast cancer, respiratory failure. COMPARISON: None. TECHNIQUE: Axial CT imaging of the abdomen and pelvis was performed with intravenous contrast. Multiplanar reformats were generated. One or more dose reduction techniques were used on this CT: automated exposure control, adjustment of the mAs and/or kVp according to patient size, and iterative reconstruction techniques. The specific techniques used on this CT exam have been documented in the patient's electronic medical record. Digital Imaging and Communications in Medicine (DICOM) format image data are available to nonaffiliated external healthcare facilities or entities on a secure, media free, reciprocally searchable basis with patient authorization for at least a 12-month period after this study. _______________ FINDINGS: LOWER CHEST: Please see contemporaneously performed CT angiogram of the chest. LIVER, BILIARY: Significantly heterogeneous enhancement pattern to the liver noted with dilated infrahepatic IVC. No biliary dilation. Cholecystectomy. PANCREAS: Normal. SPLEEN: Normal. ADRENALS: Normal. KIDNEYS/URETERS/BLADDER: No hydronephrosis. Symmetric renal enhancement. Urinary bladder decompressed with Infante catheter in situ. PELVIC ORGANS: Hysterectomy. VASCULATURE: Mild diffuse aortobiiliac atherosclerosis without evidence of aneurysmal dilatation. LYMPH NODES: No enlarged lymph nodes. GASTROINTESTINAL TRACT: Nasogastric tube is appropriately positioned within the stomach. Evaluation of small and large bowel is somewhat limited secondary to respiratory motion artifact. No morphology of bowel obstruction. No free intraperitoneal gas. Normal appendix. BONES: No acute or aggressive osseous abnormalities identified. Lower lumbar spondylosis and facet joint osteoarthritis. OTHER: None. _______________     1. Heterogeneous parenchymal enhancement which may be accentuated by contrast bolus timing. These findings may also be accentuated by low cardiac output state/congestive hepatopathy. 2. Nasogastric tube appropriately positioned within the stomach. No evidence of bowel obstruction or free intraperitoneal gas. 3. No hydronephrosis. Urinary bladder decompressed with Infante catheter in situ. XR CHEST PORT    Result Date: 12/16/2021  EXAM: XR CHEST PORT CLINICAL INDICATION/HISTORY: meets SIRS criteria -Additional: Cardiac arrest, metastatic breast cancer COMPARISON: Radiographs 10/29/2009 TECHNIQUE: Portable frontal view of the chest _______________ FINDINGS: SUPPORT DEVICES: Endotracheal tube is approximately 2.3 cm above the jean. Nasogastric tube is below the diaphragm, tip collimated from view. Right chest wall Mediport catheter tip projecting at the superior cavoatrial junction. HEART AND MEDIASTINUM: Normal appearing cardiac size and mediastinal contours. LUNGS AND PLEURAL SPACES: There are areas of diffuse bilateral pulmonary opacification, right greater than left. No evidence of pneumothorax or definite pleural effusion. BONY THORAX AND SOFT TISSUES: Bilateral axillary surgical clips. No acute osseous abnormality demonstrated. _______________     1.  Support devices in appropriate position. 2. Asymmetric, diffuse areas of pulmonary opacification, potentially edema and-or pneumonia. PFT       Ultrasound       LE Doppler       ECHO       Images report reviewed by me:  CT (Most Recent) (CT chest reviewed by me) Results from Hospital Encounter encounter on 12/16/21    CTA CHEST W OR W WO CONT    Narrative  EXAM: CTA Chest    INDICATION: Cardiac arrest, patient with history of breast cancer    COMPARISON: No prior study. TECHNIQUE: Axial CT imaging from the thoracic inlet through the diaphragm with  intravenous contrast utilizing CTA study for pulmonary artery evaluation. Coronal and sagittal MIP reformations were generated at a separate workstation. One or more dose reduction techniques were used on this CT: automated exposure  control, adjustment of the mAs and/or kVp according to patient size, and  iterative reconstruction techniques. The specific techniques used on this CT  exam have been documented in the patient's electronic medical record. Digital  Imaging and Communications in Medicine (DICOM) format image data are available  to nonaffiliated external healthcare facilities or entities on a secure, media  free, reciprocally searchable basis with patient authorization for at least a  12-month period after this study. _______________    FINDINGS:    EXAM QUALITY: Overall exam quality is adequate. Pulmonary arterial enhancement  is adequate. Although the exam is degraded by motion artifact, distal filter be  diagnostic for the exclusion of pulmonary embolism. PULMONARY ARTERIES: No convincing evidence of pulmonary embolism. MEDIASTINUM: Cardiac size is mildly enlarged. Thoracic aorta is essentially  unenhanced appearance but normal in course and caliber. No evidence of  pericardial effusion. Right chest wall Mediport catheter with tip present in the  SVC. LYMPH NODES: No enlarged mediastinal or hilar nodes by size criteria.     AIRWAY: Endotracheal tube appropriately positioned. LUNGS: Multifocal areas of consolidation are demonstrated, greatest in  conspicuity within the right upper lobe, both superiorly and posteriorly,  throughout the right middle lobe, and medial portions of the lower lobes  bilaterally, right greater than left. PLEURA: No pneumothorax. Small/trace bilateral pleural effusions. UPPER ABDOMEN: Partial inclusion nasogastric tube within the stomach. Reflux of  contrast into the infrahepatic IVC and hepatic veins. .    OTHER:  There are multiple rib fractures present:  > On the right, there are moderately displaced anterolateral fractures of the  third, fourth, fifth, sixth, seventh, eighth, and ninth ribs.  > On the left there are mildly displaced fractures of the left third, fourth,  fifth ribs. Vertebral body heights are normal. No compression fracture. Partial inclusion right breast prosthesis. _______________    Impression  1. No evidence of pulmonary embolism. 2. Multifocal areas of alveolar consolidation, greatest across the right upper,  middle, and lower lobes as described above, potentially pneumonitis related to  aspiration given the distribution. 3. Small bilateral pleural effusions without pneumothorax. 4. Multiple bilateral rib fractures as described in detail above. CXR reviewed by me:  XR (Most Recent). CXR  reviewed by me and compared with previous CXR Results from Hospital Encounter encounter on 12/16/21    XR CHEST PORT    Narrative  EXAM: XR CHEST PORT    CLINICAL INDICATION/HISTORY: et tube  -Additional: Cardiac arrest    COMPARISON: 12/16/2021    TECHNIQUE: Portable frontal view of the chest    _______________    FINDINGS:    SUPPORT DEVICES: Endotracheal tube approximately 2 cm above the jean. Nasogastric tube is below the diaphragm, tip collimated from view. Right chest  wall Mediport catheter tip projecting over the superior cavoatrial junction.     HEART AND MEDIASTINUM: Stable appearing cardiac size and mediastinal contours. LUNGS AND PLEURAL SPACES: Multifocal areas of consolidation, greatest across the  right mid and lower lung zones without evidence of pneumothorax. Small bilateral  effusions. BONY THORAX AND SOFT TISSUES: Patient's known multiple bilateral (right greater  than left) rib fractures seen to better advantage on cross-sectional imaging.    _______________    Impression  1. Support devices in position as above. 2. Multifocal, asymmetric consolidation; pneumonia/pneumonitis similar to prior. 3. Patient's known multiple bilateral rib fractures seen to better advantage on  CT imaging. No evidence of pneumothorax. ·Please note: Voice-recognition software may have been used to generate this report, which may have resulted in some phonetic-based errors in grammar and contents. Even though attempts were made to correct all the mistakes, some may have been missed, and remained in the body of the document.       Marlene Conner MD  12/17/2021

## 2021-12-17 NOTE — PROGRESS NOTES
Problem: Ventilator Management  Goal: *Adequate oxygenation and ventilation  Outcome: Progressing Towards Goal  Goal: *Patient maintains clear airway/free of aspiration  Outcome: Progressing Towards Goal  Goal: *Absence of infection signs and symptoms  Outcome: Progressing Towards Goal  Goal: *Normal spontaneous ventilation  Outcome: Progressing Towards Goal     Problem: Patient Education: Go to Patient Education Activity  Goal: Patient/Family Education  Outcome: Progressing Towards Goal     Problem: Falls - Risk of  Goal: *Absence of Falls  Description: Document Rolanda Fall Risk and appropriate interventions in the flowsheet.   Outcome: Progressing Towards Goal  Note: Fall Risk Interventions:    Medication Interventions: Bed/chair exit alarm,Evaluate medications/consider consulting pharmacy    Elimination Interventions: Bed/chair exit alarm,Toileting schedule/hourly rounds      Problem: Patient Education: Go to Patient Education Activity  Goal: Patient/Family Education  Outcome: Progressing Towards Goal     Problem: General Medical Care Plan  Goal: *Vital signs within specified parameters  Outcome: Progressing Towards Goal  Goal: *Labs within defined limits  Outcome: Progressing Towards Goal  Goal: *Absence of infection signs and symptoms  Outcome: Progressing Towards Goal  Goal: *Optimal pain control at patient's stated goal  Outcome: Progressing Towards Goal  Goal: *Skin integrity maintained  Outcome: Progressing Towards Goal  Goal: *Fluid volume balance  Outcome: Progressing Towards Goal  Goal: *Optimize nutritional status  Outcome: Progressing Towards Goal  Goal: *Anxiety reduced or absent  Outcome: Progressing Towards Goal  Goal: *Progressive mobility and function (eg: ADL's)  Outcome: Progressing Towards Goal     Problem: Patient Education: Go to Patient Education Activity  Goal: Patient/Family Education  Outcome: Progressing Towards Goal     Ailyn Mclaughlin RN

## 2021-12-17 NOTE — CONSULTS
[id number] [facility] 54: [phone]  [facility]: [address] [place] [address] Way: [phone]    Patient Name: Robbie Whipple  YOB: 1950    Date of Initial Consult: 12/17/2021   Reason for Consult: care decisions   Requesting Provider: Dr. Ila Trivedi   Primary Care Physician: Ness Norman MD      SUMMARY:   Robbie Whipple is a 73y.o. year old with a past history of breast cancer no longer on treatment, cardiomyopathy, GERD, systolic heart failure, atrial fibrillation, chronic kidney disease stage G3a/A1, who was admitted on 12/16/2021 from home with a diagnosis of cardiac arrest downtime may have been around 40 minutes. . Current medical issues leading to Palliative Medicine involvement include: 73year old female who was found down at home, CPR started and continued via EMS. She is currently not on sedation, pressor support, vent support, no cough, no gag, breathing over the vent. Has been seen by neurology who feels chances for meaningful recovery are poor. Palliative medicine is consulted for care decisions and support. PALLIATIVE DIAGNOSES:   1. Goals of care   2. Cardiac arrest   3. Acute resp failure   4. Breast cancer       PLAN:   1. Goals of care patient seen at beside along with ms bhupinder RN. Currently ventilator supported, on pressor support not on sedation. Prolonged down time with cardiac arrest. No cough or gag per neuro. Long conversation with multiple family members including sister Jesús Addison in person, brother Kiki Smiling) in [place], care giver and life partner Rayshawn Keller daughters, cousins and extended family. Family shared she has fought very hard with her breast cancer and understand this has been a catastrophic event of which is not likely to have any meaningful recovery. Family tearful. We offered support in this most difficult time. All do not believe she would want to live on machines.  Goals of care discussed: in the event of cardiac arrest no chest compressions, shock, Entered as partial code. Family wished to pray and discuss among themselves further decisions. Have discussed moving to comfort measures with compassionate extubation. Shared with family once extubated likely her time is limited to minutes only. However they also understand it is difficult to predict how long someone may survive with compassionate extubation. Family wishes to further discuss among themselves for now continue current treatment in the event of cardiac arrest no chest compressions or shock, pressor support acceptable remains intubated. Bedside RN and ICU attending updated. .received call from ICU family wishes to plan compassionate extubation Sunday at 56.   2. Cardiac arrest found down by life partner CPR started. Total downtime thought to be 40 minutes. 3. Acute respiratory failure orally intubated vent supported currently breathes over the vent. Ventilator managed by [facility]  4. Breast cancer reported to be stage IV no longer on treatment  5. Initial consult note routed to primary continuity provider  6. Communicated plan of care with: Palliative IDT, family, ICU attending, bedside RN      Patient/Health Care Proxy Stated Goals: Prolong life      TREATMENT PREFERENCES:   Code Status: Partial Code    Advance Care Planning:  [] The [facility] Interdisciplinary Team has updated the ACP Navigator with [address] and Patient Capacity    Primary Decision Maker ([address]):   No AMD on file  Not , no children   Has 4 siblings one has dementia per family and not able to make decisions   One brother in [address] [place] decision making is a [de-identified] of 3 siblings     Medical Interventions: Full interventions           Other:  As far as possible, the palliative care team has discussed with patient / health care proxy about goals of care / treatment preferences for patient.      HISTORY:     History obtained from chart and family    CHIEF COMPLAINT: Cardiac arrest     HPI/SUBJECTIVE:    The patient is:   [] Verbal and participatory  [x] Non-participatory due to: poorly responsive, orally intubated   See summary    Clinical Pain Assessment (nonverbal scale for nonverbal patients): Clinical Pain Assessment  Severity: 0     Activity (Movement): Lying quietly, normal position    Duration: for how long has pt been experiencing pain (e.g., 2 days, 1 month, years)  Frequency: how often pain is an issue (e.g., several times per day, once every few days, constant)     FUNCTIONAL ASSESSMENT:     Palliative Performance Scale (PPS):  PPS: 20    ECOG  ECOG Status : Completely disabled     PSYCHOSOCIAL/SPIRITUAL SCREENING:      Any spiritual / Scientology concerns: unable to assess   [] Yes /  [] No    Caregiver Burnout:  [] Yes /  [x] No /  [] No Caregiver Present      Anticipatory grief assessment: unable to assess   [x] Normal  / [] Maladaptive        REVIEW OF SYSTEMS:     Positive and pertinent negative findings in ROS are noted above in HPI. The following systems were [] reviewed / [x] unable to be reviewed as noted in HPI  Other findings are noted below. Systems: constitutional, ears/nose/mouth/throat, respiratory, gastrointestinal, genitourinary, musculoskeletal, integumentary, neurologic, psychiatric, endocrine. Positive findings noted below. Modified ESAS Completed by: provider           Pain: 0                                PHYSICAL EXAM:     Wt Readings from Last 3 Encounters:   12/16/21 77.1 kg (170 lb)     Blood pressure (!) 105/53, pulse 91, temperature 98.5 °F (36.9 °C), resp. rate (!) 38, height 5' 2\" (1.575 m), weight 77.1 kg (170 lb), SpO2 100 %.   Pain:  Pain Scale 1: Adult Nonverbal Pain Scale                    Last bowel movement: none recorded     Constitutional: poorly responsive female who is orally intubated, vent supported on no sedation does not appear uncomfortable   ENMT: orally intubated   Cardiovascular: RRR  Respiratory: ventilator supported breathing over vent   Skin: warm, dry  Neurologic: not on sedation, does not respond to her name or light touch          HISTORY:     Active Problems:    Shock ([facility] [address] (12/16/2021)      Cardiac arrest ([facility] [address] (12/16/2021)      Respiratory failure ([facility] [address] (12/16/2021)      Breast cancer ([facility] [address] (12/16/2021)      SAUNDRA (acute kidney injury) ([facility] [address] (12/16/2021)      LFT elevation (12/16/2021)      Acute hyperglycemia (12/16/2021)      Hypokalemia (12/16/2021)      Anoxic brain damage ([facility] [address] (12/17/2021)      Past Medical History:   Diagnosis Date    Asthma     HTN (hypertension)     Metastatic breast cancer ([facility] [address]     PAF (paroxysmal atrial fibrillation) (HCC)       No past surgical history on file. No family history on file. History reviewed, no pertinent family history.   Social History     Tobacco Use    Smoking status: Not on file    Smokeless tobacco: Not on file   Substance Use Topics    Alcohol use: Not on file     Allergies   Allergen Reactions    Latex Itching    Codeine Itching    Lisinopril Cough    Venlafaxine Other (comments)     confusion      Current Facility-Administered Medications   Medication Dose Route Frequency    dextrose 5% and 0.9% NaCl infusion  25 mL/hr IntraVENous CONTINUOUS    glucose chewable tablet 16 g  4 Tablet Oral PRN    glucagon (GLUCAGEN) injection 1 mg  1 mg IntraMUSCular PRN    dextrose (D50W) injection syrg 12.5-25 g  25-50 mL IntraVENous PRN    sodium chloride (NS) flush 5-10 mL  5-10 mL IntraVENous PRN    NOREPINephrine (LEVOPHED) 8 mg in 0.9% NS 250ml infusion  0.5-30 mcg/min IntraVENous TITRATE    0.9% sodium chloride infusion  25 mL/hr IntraVENous CONTINUOUS    pantoprazole (PROTONIX) 40 mg in 0.9% sodium chloride 10 mL injection  40 mg IntraVENous DAILY    fentaNYL citrate (PF) injection 50 mcg  50 mcg IntraVENous Q4H PRN    sodium bicarbonate (8.4%) 150 mEq in dextrose 5% 1,000 mL infusion   IntraVENous CONTINUOUS    piperacillin-tazobactam (ZOSYN) 3.375 g in 0.9% sodium chloride (MBP/ADV) 100 mL MBP  3.375 g IntraVENous Q6H    enoxaparin (LOVENOX) injection 40 mg  40 mg SubCUTAneous Q24H    vancomycin (VANCOCIN) 1,000 mg in 0.9% sodium chloride 250 mL (VIAL-MATE)  1,000 mg IntraVENous Q24H    Vancomycin - Rx to dose and monitor  1 Each Other Rx Dosing/Monitoring    albumin human 25% (BUMINATE) solution 25 g  25 g IntraVENous Q6H        LAB AND IMAGING FINDINGS:     Lab Results   Component Value Date/Time    WBC 6.0 12/17/2021 04:15 AM    HGB 9.9 (L) 12/17/2021 04:15 AM    PLATELET 328 86/07/1511 04:15 AM     Lab Results   Component Value Date/Time    Sodium 144 12/17/2021 07:42 AM    Potassium 3.1 (L) 12/17/2021 07:42 AM    Chloride 101 12/17/2021 07:42 AM    CO2 19 (L) 12/17/2021 07:42 AM    BUN 28 (H) 12/17/2021 07:42 AM    Creatinine 3.21 (H) 12/17/2021 07:42 AM    Calcium 8.9 12/17/2021 07:42 AM    Magnesium 1.8 12/17/2021 01:50 AM    Phosphorus 2.0 (L) 12/17/2021 07:42 AM      Lab Results   Component Value Date/Time    Alk. phosphatase 135 (H) 12/16/2021 12:50 PM    Protein, total 5.7 (L) 12/16/2021 12:50 PM    Albumin 3.6 12/17/2021 07:42 AM    Globulin 3.4 12/16/2021 12:50 PM     Lab Results   Component Value Date/Time    INR 2.0 (H) 12/16/2021 04:30 PM    Prothrombin time 21.4 (H) 12/16/2021 04:30 PM      No results found for: IRON, FE, TIBC, IBCT, PSAT, FERR   No results found for: PH, PCO2, PO2  No components found for: [place]   Lab Results   Component Value Date/Time    CK  12/17/2021 11:17 AM     Original order canceled by laboratory, test reordered by laboratory with the purpose of combining it with other laboratory orders.  (H) 12/17/2021 11:17 AM    CK - MB  12/17/2021 11:17 AM     Original order canceled by laboratory, test reordered by laboratory with the purpose of combining it with other laboratory orders.     CK - MB 6.5 (H) 12/17/2021 11:17 AM              Total time: 70 minutes   Counseling / coordination time, spent as noted above:   > 50% counseling / coordination: yes with family     Prolonged service was provided for  []30 min   []75 min in face to face time in the presence of the patient, spent as noted above.   Time Start:   Time End:

## 2021-12-17 NOTE — PROGRESS NOTES
Pharmacy Dosing Services: Lovenox      Indication: DVT Prophylaxis  Previous Regimen Lovenox 40 mg SubQ daily   Serum Creatinine Lab Results   Component Value Date/Time    Creatinine 3.21 (H) 12/17/2021 07:42 AM    Creatinine (POC) 1.52 (H) 12/16/2021 04:27 PM      Creatinine Clearance Estimated Creatinine Clearance: 15.9 mL/min (A) (based on SCr of 3.21 mg/dL (H)).    BUN Lab Results   Component Value Date/Time    BUN 28 (H) 12/17/2021 07:42 AM       Dose adjusted based on CrCl and indication to Lovenox 30 mg SubQ daily  Plan:  Continue to monitor

## 2021-12-17 NOTE — PROGRESS NOTES
Bedside shift change report given to Faiza Lucero RN (oncoming nurse) by Zeina Lam RN (offgoing nurse). Report included the following information SBAR, Kardex and MAR.

## 2021-12-17 NOTE — PROGRESS NOTES
Hospitalist Progress Note    Patient: Darylene Bandy MRN: [id number]  CSN: [id number]    YOB: 1950  Age: 73 y.o. Sex: female    DOA: 12/16/2021 LOS:  LOS: 1 day                Assessment/Plan     Patient Active Problem List   Diagnosis Code    Shock ([facility] [address] R57.9    Cardiac arrest ([facility] [address] I46.9    Respiratory failure ([address] J96.90    Breast cancer ([facility] [address] C50.919    SAUNDRA (acute kidney injury) ([facility] [address] N17.9    LFT elevation R79.89    Acute hyperglycemia R73.9    Hypokalemia E87.6    PAF (paroxysmal atrial fibrillation) (HCC) I48.0    Asthma J45.909    Anoxic brain damage (HCC) G93.1        Chief complaint :  Cardiac arrest  73 y.o. female with metastatic breast cancer stage IV, hypertension, asthma, PAF on Eliquis at home presents to ER status post cardiac arrest.    Unresponsive, not on sedation  Orally intubated. CRITICAL CARE PLAN     Resp - See vent orders, VAP bundle. HOB>30 degrees. Bronchodilators. Follow sputum cx. CTA chest with no PE, showed Multifocal areas of alveolar consolidation, greatest across the right upper, middle, and lower lobes as described above, potentially pneumonitis related to aspiration given the distribution. Small bilateral pleural effusions without pneumothorax. Multiple bilateral rib fractures     ID - Follow up blood cx. Aspiration pneumonia   ANTIBIOTICS vancomycin, zosyn.    de escalate if no evidence of infection     [facility] - Monitor HD. Wean pressors, levo for MAP>65. Shock - likely from cardiopulmonary arrest  Cardiopulmonary arrest  On levophed, anastasiia as tolerated  PAF - on eliquis at home, now on hold  Cardiomyopathy -   Echo with EF of 15-20%,      Heme/onc - Follow H&H, plts. Metastatic breast ca, stage 4  Was referred to hospice.      Renal - Trend BUN, Cr, follow I/O, ocasio in place. Check and replace Mg, K, phos.   Renal insufficiency     Endocrine -    Hyperglycemia - on insulin drip     Neuro/ Pain/ Sedation -   Not on sedation  No response to painful stimuli  S/p cardiopulmonary arrest.  CT head with no acute findings  anoxic brain injury  Neuro contulted      GI - NPO for now. Elevated liver function secondary to shock liver.     Prophylaxis - DVT: lovenox, GI: protonix. Very poor prognosis, palliative care met with family, considering comfort measures.     [id number]  35 minutes of critical care time spent in the direct evaluation and treatment of this high risk patient. The reason for providing this level of medical care for this critically ill patient was due a critical illness that impaired one or more vital organ systems such that there was a high probability of imminent or life threatening deterioration in the patients condition. This care involved high complexity decision making to assess, manipulate, and support vital system functions, to treat this degreee vital organ system failure and to prevent further life threatening deterioration of the patients condition. Disposition : TBD    Physical Exam:  General: Orally intubated,     HEENT: NC, Atraumatic.  anicteric sclerae. Pupils non reactive  Lungs: CTA Bilaterally. No Wheezing/Rhonchi/Rales. Heart:  S1 S2,  No murmur, No Rubs, No Gallops  Abdomen: Soft, Non distended, Non tender.  +Bowel sounds,   Extremities: No c/c/e  Psych:   Not anxious or agitated. Neurologic:  No acute neurological deficit.            Vital signs/Intake and Output:  Visit Vitals  BP (!) 94/58   Pulse 89   Temp 98.3 °F (36.8 °C)   Resp 29   Ht 5' 2\" (1.575 m)   Wt 77.1 kg (170 lb)   SpO2 93%   BMI 31.09 kg/m²     Current Shift:  12/17 0701 - 12/17 1900  In: 1709.8 [I.V.:1709.8]  Out: -   Last three shifts:  12/15 1901 - 12/17 0700  In: 1796.9 [I.V.:1796.9]  Out: 50 [Urine:50]            Labs: Results:       Chemistry Recent Labs     12/17/21  1117 12/17/21  0742 12/17/21  0150 12/16/21  1631 12/16/21  1630 12/16/21  1630 12/16/21  1250 12/16/21  1250   GLU  --  98 82  --   --  194*   < > 533*   NA  --  144 146* --   --  142   < > 134*   K 5.0 3.1* 2.7*  --    < > 2.6*   < > 2.5*   CL  --  101 103  --   --  99*   < > 91*   CO2  --  19* 18*  --   --  24   < > 25   BUN  --  28* 26*  --   --  17   < > 13   CREA  --  3.21* 2.94*  --   --  1.89*   < > 1.55*   CA  --  8.9 8.6  --   --  9.5   < > 9.0   AGAP  --  24* 25*  --   --  19*   < > 18   BUCR  --  9* 9*  --   --  9*   < > 8*   AP  --   --   --   --   --   --   --  135*   TP  --   --   --   --   --   --   --  5.7*   ALB  --  3.6  --  2.8*  --  2.7*   < > 2.3*   GLOB  --   --   --   --   --   --   --  3.4   AGRAT  --   --   --   --   --   --   --  0.7*    < > = values in this interval not displayed. CBC w/Diff Recent Labs     12/17/21  0415 12/16/21  1630 12/16/21  1109   WBC 6.0 7.7 5.5   RBC 3.22* 4.52 3.89*   HGB 9.9* 13.4 11.6*   HCT 32.9* 46.0* 40.4    240 177   GRANS 39* 59 26*   LYMPH 14* 20* 64*   EOS 0 0 2      Cardiac Enzymes Recent Labs     12/17/21  1117 12/17/21  0520   CPK Original order canceled by laboratory, test reordered by laboratory with the purpose of combining it with other laboratory orders. 728* 975*   CKND1 Original order canceled by laboratory, test reordered by laboratory with the purpose of combining it with other laboratory orders. 0.9 3.2      Coagulation Recent Labs     12/16/21  1630   PTP 21.4*   INR 2.0*       Lipid Panel No results found for: CHOL, CHOLPOCT, CHOLX, CHLST, CHOLV, 113108, HDL, HDLP, LDL, LDLC, DLDLP, 499533, VLDLC, VLDL, TGLX, TRIGL, TRIGP, TGLPOCT, CHHD, CHHDX   BNP No results for input(s): BNPP in the last 72 hours. Liver Enzymes Recent Labs     12/17/21  0742 12/16/21  1630 12/16/21  1250   TP  --   --  5.7*   ALB 3.6   < > 2.3*   AP  --   --  135*    < > = values in this interval not displayed.       Thyroid Studies Lab Results   Component Value Date/Time    TSH 1.00 10/30/2009 02:17 AM        Procedures/imaging: see electronic medical records for all procedures/Xrays and details which were not copied into this note but were reviewed prior to creation of Plan

## 2021-12-17 NOTE — CONSULTS
NEUROLOGY CONSULTATION NOTE    Patient: Estefanía Hamilton MRN: [id number]  [facility]: [id number]    YOB: 1950  Age: 73 y.o. Sex: female    DOA: 12/16/2021 LOS:  LOS: 1 day        Requesting Physician:  Dr. Lj Pardo  Reason for Consultation: anoxic brain injury. HISTORY OF PRESENT ILLNESS:   Estefanía Hamilton is a 73 y.o. female with history of congestive heart failure, atrial fibrillation, pacemaker placement, breast cancer, who presented with PEA arrest. She needed to receive CPR and intubation before the delivery to ER and also during the ER presentation. Currently, the patient is comatose. The history was obtained from the chart review. PAST MEDICAL HISTORY:  Past Medical History:   Diagnosis Date    Asthma     HTN (hypertension)     [facility]ast[facility]     [facility]      PAST SURGICAL HISTORY:  No past surgical history on file. FAMILY HISTORY:  No family history on file.   SOCIAL HISTORY:  Social History     Socioeconomic History    Marital status: SINGLE     MEDICATIONS:  Current Facility-Administered Medications   Medication Dose Route Frequency    dextrose 5% and 0.9% NaCl infusion  25 mL/hr IntraVENous CONTINUOUS    glucose chewable tablet 16 g  4 Tablet Oral PRN    glucagon (GLUCAGEN) injection 1 mg  1 mg IntraMUSCular PRN    dextrose (D50W) injection syrg 12.5-25 g  25-50 mL IntraVENous PRN    sodium chloride (NS) flush 5-10 mL  5-10 mL IntraVENous PRN    NOREPINephrine (LEVOPHED) 8 mg in 0.9% NS 250ml infusion  0.5-30 mcg/min IntraVENous TITRATE    0.9% sodium chloride infusion  25 mL/hr IntraVENous CONTINUOUS    pantoprazole (PROTONIX) 40 mg in 0.9% sodium chloride 10 mL injection  40 mg IntraVENous DAILY    fentaNYL citrate (PF) injection 50 mcg  50 mcg IntraVENous Q4H PRN    sodium bicarbonate (8.4%) 150 mEq in dextrose 5% 1,000 mL infusion   IntraVENous CONTINUOUS    piperacillin-tazobactam (ZOSYN) 3.375 g in 0.9% sodium chloride (MBP/ADV) 100 mL MBP  3.375 g IntraVENous Q6H    enoxaparin (LOVENOX) injection 40 mg  40 mg SubCUTAneous Q24H    vancomycin (VANCOCIN) 1,000 mg in 0.9% sodium chloride 250 mL (VIAL-MATE)  1,000 mg IntraVENous Q24H    Vancomycin - Rx to dose and monitor  1 Each Other Rx Dosing/Monitoring    albumin human 25% (BUMINATE) solution 25 g  25 g IntraVENous Q6H     Prior to Admission medications    Not on File       ALLERGIES:  Allergies   Allergen Reactions    Latex Itching    Codeine Itching    Lisinopril Cough    Venlafaxine Other (comments)     confusion       Review of Systems  I am unable to review the systems. The patient is comatose. PHYSICAL EXAMINATION:     Visit Vitals  BP (!) 112/59   Pulse 91   Temp 98.5 °F (36.9 °C)   Resp (!) 33   Ht 5' 2\" (1.575 m)   Wt 77.1 kg (170 lb)   SpO2 100%   BMI 31.09 kg/m²         GENERAL: intubated, unresponsive  HEENT: Moist mucous membranes, sclerae anicteric, scalp is atraumatic. ET in.  CVS: Regular rate and rhythm, no murmurs or gallops. No carotid bruits. PULMONARY: Clear to auscultation bilaterally clearly. EXTREMITIES: Normal range of motion at all sites. No deformities. ABDOMEN: Soft, nontender. SKIN: No rashes or ecchymoses. Warm and dry. NEUROLOGIC: the patient is comatose. She's not responding to her name. Face looks symmetric. Pupils are equal and reactive to light. Corneal reflexes are negative. Gag and cough reflexes are negative. She is over breathing the event. She doesn't withdraw  to noxious stimuli on any of the extremities. Deep tendon reflexes are depressed.     Labs: Results:       Chemistry Recent Labs     12/17/21  0742 12/17/21  0150 12/16/21  1631 12/16/21  1630 12/16/21  1250 12/16/21  1250   GLU 98 82  --  194*   < > 533*    146*  --  142   < > 134*   K 3.1* 2.7*  --  2.6*   < > 2.5*    103  --  99*   < > 91*   CO2 19* 18*  --  24   < > 25   BUN 28* 26*  --  17   < > 13   CREA 3.21* 2.94*  --  1.89*   < > 1.55* CA 8.9 8.6  --  9.5   < > 9.0   AGAP 24* 25*  --  19*   < > 18   BUCR 9* 9*  --  9*   < > 8*   AP  --   --   --   --   --  135*   TP  --   --   --   --   --  5.7*   ALB 3.6  --  2.8* 2.7*   < > 2.3*   GLOB  --   --   --   --   --  3.4   AGRAT  --   --   --   --   --  0.7*    < > = values in this interval not displayed. CBC w/Diff Recent Labs     12/17/21  0415 12/16/21  1630 12/16/21  1109   WBC 6.0 7.7 5.5   RBC 3.22* 4.52 3.89*   HGB 9.9* 13.4 11.6*   HCT 32.9* 46.0* 40.4    240 177   GRANS 39* 59 26*   LYMPH 14* 20* 64*   EOS 0 0 2      Cardiac Enzymes Recent Labs     12/17/21  0520 12/17/21  0415   * Original order canceled by laboratory, test reordered by laboratory with the purpose of combining it with other laboratory orders. CKND1 3.2 Original order canceled by laboratory, test reordered by laboratory with the purpose of combining it with other laboratory orders. Coagulation Recent Labs     12/16/21  1630   PTP 21.4*   INR 2.0*       Lipid Panel No results found for: CHOL, CHOLPOCT, CHOLX, CHLST, CHOLV, 008177, HDL, HDLP, LDL, LDLC, DLDLP, 390519, VLDLC, VLDL, TGLX, TRIGL, TRIGP, TGLPOCT, CHHD, CHHDX   BNP No results for input(s): BNPP in the last 72 hours. Liver Enzymes Recent Labs     12/17/21  0742 12/16/21  1630 12/16/21  1250   TP  --   --  5.7*   ALB 3.6   < > 2.3*   AP  --   --  135*    < > = values in this interval not displayed. Thyroid Studies Lab Results   Component Value Date/Time    TSH 1.00 10/30/2009 02:17 AM          Radiology:  CT HEAD WO CONT    Result Date: 12/16/2021  EXAM: CT head INDICATION: Cardiac arrest, metastatic breast cancer COMPARISON: None. TECHNIQUE: Axial CT imaging of the head was performed without intravenous contrast. Standard multiplanar coronal and sagittal reformatted images were obtained and are included in interpretation.  One or more dose reduction techniques were used on this CT: automated exposure control, adjustment of the mAs and/or kVp according to patient size, and iterative reconstruction techniques. The specific techniques used on this CT exam have been documented in the patient's electronic medical record. Digital Imaging and Communications in Medicine (DICOM) format image data are available to nonaffiliated external healthcare facilities or entities on a secure, media free, reciprocally searchable basis with patient authorization for at least a 12-month period after this study. _______________ FINDINGS: BRAIN AND POSTERIOR FOSSA: Mild cortical and cerebellar volume loss is present. The ventricular size and configuration is within normal limits. Basilar cisterns are patent. Mild periventricular white matter low-attenuation is present. There is no intracranial hemorrhage, mass effect, or midline shift. The gray-white matter differentiation is within normal limits. EXTRA-AXIAL SPACES AND MENINGES: There are no abnormal extra-axial fluid collections. CALVARIUM: Intact. SINUSES: Layering fluid present within the sphenoid sinus with mild mucosal thickening of the anterior and posterior ethmoid air cells. OTHER: Ocular lenses are surgically replaced _______________     1. No acute intracranial abnormality demonstrated. 2. Subcortical and periventricular white matter low-attenuation as can be seen with sequela of chronic ischemic microvascular change 3. Nonspecific paranasal mucosal thickening/fluid    CTA CHEST W OR W WO CONT    Result Date: 12/16/2021  EXAM: CTA Chest INDICATION: Cardiac arrest, patient with history of breast cancer COMPARISON: No prior study. TECHNIQUE: Axial CT imaging from the thoracic inlet through the diaphragm with intravenous contrast utilizing CTA study for pulmonary artery evaluation. Coronal and sagittal MIP reformations were generated at a separate workstation.  One or more dose reduction techniques were used on this CT: automated exposure control, adjustment of the mAs and/or kVp according to patient size, and iterative reconstruction techniques. The specific techniques used on this CT exam have been documented in the patient's electronic medical record. Digital Imaging and Communications in Medicine (DICOM) format image data are available to nonaffiliated external healthcare facilities or entities on a secure, media free, reciprocally searchable basis with patient authorization for at least a 12-month period after this study. _______________ FINDINGS: EXAM QUALITY: Overall exam quality is adequate. Pulmonary arterial enhancement is adequate. Although the exam is degraded by motion artifact, distal filter be diagnostic for the exclusion of pulmonary embolism. PULMONARY ARTERIES: No convincing evidence of pulmonary embolism. MEDIASTINUM: Cardiac size is mildly enlarged. Thoracic aorta is essentially unenhanced appearance but normal in course and caliber. No evidence of pericardial effusion. Right chest wall Mediport catheter with tip present in the SVC. LYMPH NODES: No enlarged mediastinal or hilar nodes by size criteria. AIRWAY: Endotracheal tube appropriately positioned. LUNGS: Multifocal areas of consolidation are demonstrated, greatest in conspicuity within the right upper lobe, both superiorly and posteriorly, throughout the right middle lobe, and medial portions of the lower lobes bilaterally, right greater than left. PLEURA: No pneumothorax. Small/trace bilateral pleural effusions. UPPER ABDOMEN: Partial inclusion nasogastric tube within the stomach. Reflux of contrast into the infrahepatic IVC and hepatic veins. . OTHER: There are multiple rib fractures present:   > On the right, there are moderately displaced anterolateral fractures of the third, fourth, fifth, sixth, seventh, eighth, and ninth ribs.   > On the left there are mildly displaced fractures of the left third, fourth, fifth ribs. Vertebral body heights are normal. No compression fracture. Partial inclusion right breast prosthesis.  _______________ 1. No evidence of pulmonary embolism. 2. Multifocal areas of alveolar consolidation, greatest across the right upper, middle, and lower lobes as described above, potentially pneumonitis related to aspiration given the distribution. 3. Small bilateral pleural effusions without pneumothorax. 4. Multiple bilateral rib fractures as described in detail above. CT ABD PELV W CONT    Result Date: 12/16/2021  EXAM: CT of the abdomen and pelvis INDICATION: Abdominal pain, cardiac arrest, breast cancer, respiratory failure. COMPARISON: None. TECHNIQUE: Axial CT imaging of the abdomen and pelvis was performed with intravenous contrast. Multiplanar reformats were generated. One or more dose reduction techniques were used on this CT: automated exposure control, adjustment of the mAs and/or kVp according to patient size, and iterative reconstruction techniques. The specific techniques used on this CT exam have been documented in the patient's electronic medical record. Digital Imaging and Communications in Medicine (DICOM) format image data are available to nonaffiliated external healthcare facilities or entities on a secure, media free, reciprocally searchable basis with patient authorization for at least a 12-month period after this study. _______________ FINDINGS: LOWER CHEST: Please see contemporaneously performed CT angiogram of the chest. LIVER, BILIARY: Significantly heterogeneous enhancement pattern to the liver noted with dilated infrahepatic IVC. No biliary dilation. Cholecystectomy. PANCREAS: Normal. SPLEEN: Normal. ADRENALS: Normal. KIDNEYS/URETERS/BLADDER: No hydronephrosis. Symmetric renal enhancement. Urinary bladder decompressed with Infante catheter in situ. PELVIC ORGANS: Hysterectomy. VASCULATURE: Mild diffuse aortobiiliac atherosclerosis without evidence of aneurysmal dilatation. LYMPH NODES: No enlarged lymph nodes.  GASTROINTESTINAL TRACT: Nasogastric tube is appropriately positioned within the stomach. Evaluation of small and large bowel is somewhat limited secondary to respiratory motion artifact. No morphology of bowel obstruction. No free intraperitoneal gas. Normal appendix. BONES: No acute or aggressive osseous abnormalities identified. Lower lumbar spondylosis and facet joint osteoarthritis. OTHER: None. _______________     1. Heterogeneous parenchymal enhancement which may be accentuated by contrast bolus timing. These findings may also be accentuated by low cardiac output state/congestive hepatopathy. 2. Nasogastric tube appropriately positioned within the stomach. No evidence of bowel obstruction or free intraperitoneal gas. 3. No hydronephrosis. Urinary bladder decompressed with Infante catheter in situ. XR CHEST PORT    Result Date: 12/17/2021  EXAM: XR CHEST PORT CLINICAL INDICATION/HISTORY: et tube -Additional: Cardiac arrest COMPARISON: 12/16/2021 TECHNIQUE: Portable frontal view of the chest _______________ FINDINGS: SUPPORT DEVICES: Endotracheal tube approximately 2 cm above the jean. Nasogastric tube is below the diaphragm, tip collimated from view. Right chest wall Mediport catheter tip projecting over the superior cavoatrial junction. HEART AND MEDIASTINUM: Stable appearing cardiac size and mediastinal contours. LUNGS AND PLEURAL SPACES: Multifocal areas of consolidation, greatest across the right mid and lower lung zones without evidence of pneumothorax. Small bilateral effusions. BONY THORAX AND SOFT TISSUES: Patient's known multiple bilateral (right greater than left) rib fractures seen to better advantage on cross-sectional imaging. _______________     1. Support devices in position as above. 2. Multifocal, asymmetric consolidation; pneumonia/pneumonitis similar to prior. 3. Patient's known multiple bilateral rib fractures seen to better advantage on CT imaging. No evidence of pneumothorax.      XR CHEST PORT    Result Date: 12/16/2021  EXAM: XR CHEST PORT CLINICAL INDICATION/HISTORY: meets SIRS criteria -Additional: Cardiac arrest, metastatic breast cancer COMPARISON: Radiographs 10/29/2009 TECHNIQUE: Portable frontal view of the chest _______________ FINDINGS: SUPPORT DEVICES: Endotracheal tube is approximately 2.3 cm above the jean. Nasogastric tube is below the diaphragm, tip collimated from view. Right chest wall Mediport catheter tip projecting at the superior cavoatrial junction. HEART AND MEDIASTINUM: Normal appearing cardiac size and mediastinal contours. LUNGS AND PLEURAL SPACES: There are areas of diffuse bilateral pulmonary opacification, right greater than left. No evidence of pneumothorax or definite pleural effusion. BONY THORAX AND SOFT TISSUES: Bilateral axillary surgical clips. No acute osseous abnormality demonstrated. _______________     1. Support devices in appropriate position. 2. Asymmetric, diffuse areas of pulmonary opacification, potentially edema and-or pneumonia. ECHO ADULT COMPLETE    Result Date: 12/16/2021    Left Ventricle: Left ventricle is mildly dilated. Normal wall thickness. See diagram for wall motion findings. Severely reduced left ventricular systolic function with a visually estimated EF of 15 - 20%.   Right Ventricle: Not well visualized. Right ventricle is mildly dilated. Moderately reduced systolic function.   Left Atrium: Left atrium is mildly dilated.   Pulmonary Artery : Estimated Pulmonary Systolic Artery is 33 mmhg. Pulmonary hypertension found to be mild.   Patient is ventilated, cannot use IVC diameter to estimate right atrial pressure. IVC size is normal.     ASSESSMENT/IMPRESSION:  Cardiopulmonary arrest, possibly resulting in anoxic brain injury. The prognosis is extremely guarded. She cannot get MRI due to pacemaker. The patient will be evaluated with an EEG. RECOMMENDATIONS:  1. EEG  2. Repeat head CT tomorrow. 3. Palliative care consult and goals of care discussions.  I don't think the patient will recover from this with meaningful neurological recovery.     Please do not hesitate to return with any questions.    ------------------------------------  Shala Luciano MD  12/17/2021  11:19 AM

## 2021-12-17 NOTE — PROGRESS NOTES
Many family in and out through the day. Focus on patient's sister Ubaldo Burleson and patient's partner Tamra Dupree. Lots of time with various family members. All agree Garrison Monae would not want to live like this. \"    Patient was active at [facility]. Veto Galloway has been in and had prayers with the family. This afternoon I was informed that the family will do compassionate extubation on Sunday morning at 10. On call  has been informed. Please page before extubation. [id number]   Board Certified   [phone] - Office

## 2021-12-17 NOTE — PROGRESS NOTES
2016- TRANSFER - IN REPORT:    Verbal report received from RITA Bahena (name) on Carlos Giles  being received from ED (unit) for routine progression of care      Report consisted of patients Situation, Background, Assessment and   Recommendations(SBAR). Information from the following report(s) SBAR, Kardex, ED Summary, Intake/Output, MAR, Recent Results, Med Rec Status and Cardiac Rhythm NSR was reviewed with the receiving nurse. Opportunity for questions and clarification was provided. Assessment completed upon patients arrival to unit and care assumed. 2030- Admission assessment completed. Pt GCS of 3 and unresponsive to any stimuli. Currently intubated and sedated. CHG bath given. 4 Eyes skin assessment conducted w/ K. Maria T Anaya RN. No abnormalities noted. Oral, tony, and external catheter care provided. Pt is resting in bed w/ family at bedside briefly to say goodbye. Will continue to monitor. 2320- Sugar was 43. 25g of D50W IV administered per protocol. Will recheck in appropriate amount of time. 2340- Sugar recheck delayed d/t all, but one glucometer being use and not being QC'd today yet. Glucometer QC'd and sugar now 140. Will continue to monitor. 0000- Reassessment completed. No changes from previous assessment. 0400- Reassessment completed. No changes from previous assessment. 0700- shift change report given to Pee Wen (oncoming nurse) by A. Jeannette Koyanagi (offgoing nurse). Report included the following information SBAR, Kardex, ED Summary, Intake/Output, MAR, Recent Results, Med Rec Status and Cardiac Rhythm NSR.

## 2021-12-18 NOTE — PROGRESS NOTES
Hospitalist Progress Note    Patient: Estefanía Hamilton MRN: [id number]  CSN: [id number]    YOB: 1950  Age: 73 y.o. Sex: female    DOA: 12/16/2021 LOS:  LOS: 2 days                Assessment and Plan:    Principal Problem:    Cardiac arrest ([address] (12/16/2021)    Active Problems:    Shock ([address] (12/16/2021)      Respiratory failure ([address] (12/16/2021)      Breast cancer ([address] (12/16/2021)      SAUNDRA (acute kidney injury) ([address] (12/16/2021)      LFT elevation (12/16/2021)      Acute hyperglycemia (12/16/2021)      Hypokalemia (12/16/2021)      Anoxic brain damage ([address] (12/17/2021)        Resp: on vent and managed by pulmonary. Compassionate extubation tomorrow    Aspiration pneumonia: on antibiotics    Cardiomyopathy: this is poor prognosis. AM hesitant to increase pressors in this case    I attempted to call sister and life partner but no response. Family has been in today and understands her prognosis        Chief complaint:  73 y.o. female with metastatic breast cancer stage IV, hypertension, asthma, PAF on Eliquis at home presents to ER status post cardiac arrest.      Subjective: Intubated, not sedated but notr esponsive      Review of systems:    General: No fevers or chills. Cardiovascular: No chest pain or pressure. No palpitations. Pulmonary: No shortness of breath. Gastrointestinal: No nausea, vomiting. Objective:    Vital signs/Intake and Output:  Visit Vitals  BP (!) 99/47   Pulse 99   Temp 99.5 °F (37.5 °C)   Resp 29   Ht 5' 2\" (1.575 m)   Wt 85.9 kg (189 lb 6 oz)   SpO2 100%   BMI 34.64 kg/m²     Current Shift:  12/18 0701 - 12/18 1900  In: 2079.8 [I.V.:2079.8]  Out: 1400 [Drains:1400]  Last three shifts:  12/16 1901 - 12/18 0700  In: 5154.8 [I.V.:5154.8]  Out: 50 [Urine:50]    Physical Exam:  General: NAD, AAOx3. Non-toxic. HEENT: NC/AT. PERRLA, EOMI.  MMM. Lungs: Nml inspection. CTA B/L. No wheezing, rales or rhonchi. Heart:  S1S2 RRR,  PMI mid 5th IC space.  No M/RG.  Abdomen: Soft, NT/ND.  BS+. No peritoneal signs. Extremities: No C/C/E. Psych:   Nml affect. Neurologic:  2-12 intact. Strength 5/5 throughout. Sensation symmetrical.          Labs: Results:       Chemistry Recent Labs     12/18/21  0525 12/17/21  1117 12/17/21  0742 12/17/21  0150 12/17/21  0150 12/16/21  1631 12/16/21  1630 12/16/21  1250   GLU 56*  --  98  --  82  --    < > 533*     --  144  --  146*  --    < > 134*   K 4.6 5.0 3.1*   < > 2.7*  --    < > 2.5*   CL 97*  --  101  --  103  --    < > 91*   CO2 14*  --  19*  --  18*  --    < > 25   BUN 40*  --  28*  --  26*  --    < > 13   CREA 5.59*  --  3.21*  --  2.94*  --    < > 1.55*   CA 7.5*  --  8.9  --  8.6  --    < > 9.0   AGAP 32*  --  24*  --  25*  --    < > 18   BUCR 7*  --  9*  --  9*  --    < > 8*   AP  --   --   --   --   --   --   --  135*   TP  --   --   --   --   --   --   --  5.7*   ALB 4.1  --  3.6  --   --  2.8*   < > 2.3*   GLOB  --   --   --   --   --   --   --  3.4   AGRAT  --   --   --   --   --   --   --  0.7*    < > = values in this interval not displayed. CBC w/Diff Recent Labs     12/18/21  0525 12/17/21  0415 12/16/21  1630   WBC 12.4 6.0 7.7   RBC 2.66* 3.22* 4.52   HGB 8.2* 9.9* 13.4   HCT 28.0* 32.9* 46.0*   * 164 240   GRANS 52 39* 59   LYMPH 6* 14* 20*   EOS 0 0 0      Cardiac Enzymes Recent Labs     12/17/21  1117 12/17/21  0520   CPK Original order canceled by laboratory, test reordered by laboratory with the purpose of combining it with other laboratory orders. 728* 975*   CKND1 Original order canceled by laboratory, test reordered by laboratory with the purpose of combining it with other laboratory orders.   0.9 3.2      Coagulation Recent Labs     12/16/21  1630   PTP 21.4*   INR 2.0*       Lipid Panel No results found for: CHOL, CHOLPOCT, CHOLX, CHLST, CHOLV, 998975, HDL, HDLP, LDL, LDLC, DLDLP, [id number], VLDLC, VLDL, TGLX, TRIGL, TRIGP, TGLPOCT, CHHD, CHHDX   BNP No results for input(s): BNPP in the last 72 hours. Liver Enzymes Recent Labs     12/18/21  0525 12/16/21  1630 12/16/21  1250   TP  --   --  5.7*   ALB 4.1   < > 2.3*   AP  --   --  135*    < > = values in this interval not displayed.       Thyroid Studies Lab Results   Component Value Date/Time    TSH 1.00 10/30/2009 02:17 AM        Procedures/imaging: see electronic medical records for all procedures/Xrays and details which were not copied into this note but were reviewed prior to creation of Plan

## 2021-12-18 NOTE — PROGRESS NOTES
0800-Received pt unresponsive in bed on ventilator and pressor, no sign of distress, will continue to monitor  1600-Pt needing increased blood pressure support throughout shift, family still planning to withdraw care tomorrow morning  1830-Pt blood pressures borderline with MAP<65, hospitalist aware, do not plan to start another pressor due to plan for comfort extubation in am  1910-Bedside and Verbal shift change report given to Obie Bear (oncoming nurse) by Mallika Del Angel RN (offgoing nurse). Report included the following information SBAR, Kardex, Intake/Output, MAR, Recent Results and Cardiac Rhythm SR/ST.

## 2021-12-18 NOTE — PROGRESS NOTES
NEUROLOGY PROGRESS NOTE        Patient: Ellen Matias        Sex: female          DOA: 12/16/2021  YOB: 1950      Age:  73 y.o.         LOS: 2 days     Identification:  73 y.o. female with history of congestive heart failure, atrial fibrillation, pacemaker placement, breast cancer, who presented with cardiac arrest.    SUBJECTIVE:   Pt is unresponsive. With no sedation. She is overbreathing the vent. REVIEW OF SYSTEMS: Unable to review. Comatose. OBJECTIVE:      Visit Vitals  BP (!) 96/43   Pulse 92   Temp 99.5 °F (37.5 °C)   Resp (!) 34   Ht 5' 2\" (1.575 m)   Wt 85.9 kg (189 lb 6 oz)   SpO2 100%   BMI 34.64 kg/m²     Physical Exam:  GEN: Comatose. EYES: dry cornea  HEENT: ET in. HEART: RRR +S1 +S2  LUNGS: CTA B/L no rales or rhonchi. Agonal breathing, overbreathing the vent. EXTREMITIES: No edema cyanosis  SKIN: no rashes or skin breakdown, no nodules  NEURO: Comatose, unresponsive. Gag, corneals, pupils, cough are all negative. No withdrawal to noxious stimuli. Oculovestibular reflex absent. DTRs absent.     Current Facility-Administered Medications   Medication Dose Route Frequency    white petrolatum-mineral oiL (LACRILUBE S.O.P.) ointment   Both Eyes Q12H    glucose chewable tablet 16 g  4 Tablet Oral PRN    glucagon (GLUCAGEN) injection 1 mg  1 mg IntraMUSCular PRN    dextrose (D50W) injection syrg 12.5-25 g  25-50 mL IntraVENous PRN    piperacillin-tazobactam (ZOSYN) 3.375 g in 0.9% sodium chloride (MBP/ADV) 100 mL MBP  3.375 g IntraVENous Q12H    enoxaparin (LOVENOX) injection 30 mg  30 mg SubCUTAneous Q24H    vancomycin (VANCOCIN) 500 mg in 0.9% sodium chloride (MBP/ADV) 100 mL MBP  500 mg IntraVENous Q24H    NOREPINephrine (LEVOPHED) 8 mg in 5% dextrose 250mL (32 mcg/mL) infusion  0.5-30 mcg/min IntraVENous TITRATE    acetaminophen (TYLENOL) suppository 650 mg  650 mg Rectal Q6H PRN    dextrose 10% infusion  50 mL/hr IntraVENous CONTINUOUS    sodium chloride (NS) flush 5-10 mL  5-10 mL IntraVENous PRN    0.9% sodium chloride infusion  25 mL/hr IntraVENous CONTINUOUS    pantoprazole (PROTONIX) 40 mg in 0.9% sodium chloride 10 mL injection  40 mg IntraVENous DAILY    fentaNYL citrate (PF) injection 50 mcg  50 mcg IntraVENous Q4H PRN    sodium bicarbonate (8.4%) 150 mEq in dextrose 5% 1,000 mL infusion   IntraVENous CONTINUOUS    Vancomycin - Rx to dose and monitor  1 Each Other Rx Dosing/Monitoring    albumin human 25% (BUMINATE) solution 25 g  25 g IntraVENous Q6H       Laboratory  Recent Results (from the past 24 hour(s))   CARDIAC PANEL,(CK, CKMB & TROPONIN)    Collection Time: 12/17/21 11:17 AM   Result Value Ref Range    CK - MB  ng/ml     Original order canceled by laboratory, test reordered by laboratory with the purpose of combining it with other laboratory orders. CK-MB Index  %     Original order canceled by laboratory, test reordered by laboratory with the purpose of combining it with other laboratory orders. CK  U/L     Original order canceled by laboratory, test reordered by laboratory with the purpose of combining it with other laboratory orders.     Troponin-High Sensitivity 60,739 (HH) 0 - 54 ng/L   POTASSIUM    Collection Time: 12/17/21 11:17 AM   Result Value Ref Range    Potassium 5.0 3.5 - 5.5 mmol/L   CKMB PROFILE    Collection Time: 12/17/21 11:17 AM   Result Value Ref Range     (H) 26 - 192 U/L    CK - MB 6.5 (H) <3.6 ng/ml    CK-MB Index 0.9 0.0 - 4.0 %   GLUCOSE, POC    Collection Time: 12/17/21 12:19 PM   Result Value Ref Range    Glucose (POC) 65 (L) 70 - 110 mg/dL   GLUCOSE, POC    Collection Time: 12/17/21 12:57 PM   Result Value Ref Range    Glucose (POC) 131 (H) 70 - 110 mg/dL   GLUCOSE, POC    Collection Time: 12/17/21  4:58 PM   Result Value Ref Range    Glucose (POC) 57 (L) 70 - 110 mg/dL   GLUCOSE, POC    Collection Time: 12/17/21  5:43 PM   Result Value Ref Range    Glucose (POC) 54 (LL) 70 - 110 mg/dL   GLUCOSE, POC Collection Time: 12/17/21  6:14 PM   Result Value Ref Range    Glucose (POC) 126 (H) 70 - 110 mg/dL   GLUCOSE, POC    Collection Time: 12/17/21 11:55 PM   Result Value Ref Range    Glucose (POC) 94 70 - 110 mg/dL   BLOOD GAS, ARTERIAL POC    Collection Time: 12/18/21  4:50 AM   Result Value Ref Range    Device: ADULT VENT      FIO2 (POC) 100 %    pH (POC) 7.22 (LL) 7.35 - 7.45      pCO2 (POC) 30.8 (L) 35.0 - 45.0 MMHG    pO2 (POC) 350 (H) 80 - 100 MMHG    HCO3 (POC) 12.6 (L) 22 - 26 MMOL/L    sO2 (POC) 99.9 (H) 92 - 97 %    Base deficit (POC) 13.9 mmol/L    Mode Volume Control      Tidal volume 400 ml    Set Rate 30 bpm    PEEP/CPAP (POC) 5 cmH2O    PIP (POC) 21      Allens test (POC) Positive      Site RIGHT RADIAL      Patient temp.  98.6      Specimen type (POC) ARTERIAL      Performed by Marija Johnson    GLUCOSE, POC    Collection Time: 12/18/21  5:24 AM   Result Value Ref Range    Glucose (POC) 57 (L) 70 - 110 mg/dL   RENAL FUNCTION PANEL    Collection Time: 12/18/21  5:25 AM   Result Value Ref Range    Sodium 143 136 - 145 mmol/L    Potassium 4.6 3.5 - 5.5 mmol/L    Chloride 97 (L) 100 - 111 mmol/L    CO2 14 (L) 21 - 32 mmol/L    Anion gap 32 (H) 3.0 - 18 mmol/L    Glucose 56 (L) 74 - 99 mg/dL    BUN 40 (H) 7.0 - 18 MG/DL    Creatinine 5.59 (H) 0.6 - 1.3 MG/DL    BUN/Creatinine ratio 7 (L) 12 - 20      GFR est AA 9 (L) >60 ml/min/1.73m2    GFR est non-AA 8 (L) >60 ml/min/1.73m2    Calcium 7.5 (L) 8.5 - 10.1 MG/DL    Phosphorus 7.6 (H) 2.5 - 4.9 MG/DL    Albumin 4.1 3.4 - 5.0 g/dL   CBC WITH AUTOMATED DIFF    Collection Time: 12/18/21  5:25 AM   Result Value Ref Range    WBC 12.4 4.6 - 13.2 K/uL    RBC 2.66 (L) 4.20 - 5.30 M/uL    HGB 8.2 (L) 12.0 - 16.0 g/dL    HCT 28.0 (L) 35.0 - 45.0 %    .3 (H) 78.0 - 100.0 FL    MCH 30.8 24.0 - 34.0 PG    MCHC 29.3 (L) 31.0 - 37.0 g/dL    RDW 20.3 (H) 11.6 - 14.5 %    PLATELET 129 (L) 716 - 420 K/uL    MPV 10.3 9.2 - 11.8 FL    NRBC 2.1 (H) 0  WBC    ABSOLUTE NRBC 0.26 (H) 0.00 - 0.01 K/uL    NEUTROPHILS 52 40 - 73 %    BAND NEUTROPHILS 24 (H) 0 - 5 %    LYMPHOCYTES 6 (L) 21 - 52 %    MONOCYTES 5 3 - 10 %    EOSINOPHILS 0 0 - 5 %    BASOPHILS 0 0 - 2 %    METAMYELOCYTES 11 (H) 0 %    MYELOCYTES 2 (H) 0 %    IMMATURE GRANULOCYTES 0 %    ABS. NEUTROPHILS 9.4 (H) 1.8 - 8.0 K/UL    ABS. LYMPHOCYTES 0.7 (L) 0.9 - 3.6 K/UL    ABS. MONOCYTES 0.6 0.05 - 1.2 K/UL    ABS. EOSINOPHILS 0.0 0.0 - 0.4 K/UL    ABS. BASOPHILS 0.0 0.0 - 0.1 K/UL    ABS. IMM. GRANS. 0.0 K/UL    DF MANUAL      PLATELET COMMENTS DECREASED PLATELETS      RBC COMMENTS ANISOCYTOSIS  1+        RBC COMMENTS CHARLIE CELLS  2+        RBC COMMENTS SCHISTOCYTES  1+        RBC COMMENTS POIKILOCYTOSIS  1+       GLUCOSE, POC    Collection Time: 12/18/21  5:50 AM   Result Value Ref Range    Glucose (POC) 134 (H) 70 - 110 mg/dL       Radiology:  CT HEAD WO CONT    Result Date: 12/16/2021  EXAM: CT head INDICATION: Cardiac arrest, metastatic breast cancer COMPARISON: None. TECHNIQUE: Axial CT imaging of the head was performed without intravenous contrast. Standard multiplanar coronal and sagittal reformatted images were obtained and are included in interpretation. One or more dose reduction techniques were used on this CT: automated exposure control, adjustment of the mAs and/or kVp according to patient size, and iterative reconstruction techniques. The specific techniques used on this CT exam have been documented in the patient's electronic medical record. Digital Imaging and Communications in Medicine (DICOM) format image data are available to nonaffiliated external healthcare facilities or entities on a secure, media free, reciprocally searchable basis with patient authorization for at least a 12-month period after this study. _______________ FINDINGS: BRAIN AND POSTERIOR FOSSA: Mild cortical and cerebellar volume loss is present. The ventricular size and configuration is within normal limits.  Basilar cisterns are patent. Mild periventricular white matter low-attenuation is present. There is no intracranial hemorrhage, mass effect, or midline shift. The gray-white matter differentiation is within normal limits. EXTRA-AXIAL SPACES AND MENINGES: There are no abnormal extra-axial fluid collections. CALVARIUM: Intact. SINUSES: Layering fluid present within the sphenoid sinus with mild mucosal thickening of the anterior and posterior ethmoid air cells. OTHER: Ocular lenses are surgically replaced _______________     1. No acute intracranial abnormality demonstrated. 2. Subcortical and periventricular white matter low-attenuation as can be seen with sequela of chronic ischemic microvascular change 3. Nonspecific paranasal mucosal thickening/fluid    CTA CHEST W OR W WO CONT    Result Date: 12/16/2021  EXAM: CTA Chest INDICATION: Cardiac arrest, patient with history of breast cancer COMPARISON: No prior study. TECHNIQUE: Axial CT imaging from the thoracic inlet through the diaphragm with intravenous contrast utilizing CTA study for pulmonary artery evaluation. Coronal and sagittal MIP reformations were generated at a separate workstation. One or more dose reduction techniques were used on this CT: automated exposure control, adjustment of the mAs and/or kVp according to patient size, and iterative reconstruction techniques. The specific techniques used on this CT exam have been documented in the patient's electronic medical record. Digital Imaging and Communications in Medicine (DICOM) format image data are available to nonaffiliated external healthcare facilities or entities on a secure, media free, reciprocally searchable basis with patient authorization for at least a 12-month period after this study. _______________ FINDINGS: EXAM QUALITY: Overall exam quality is adequate. Pulmonary arterial enhancement is adequate.  Although the exam is degraded by motion artifact, distal filter be diagnostic for the exclusion of pulmonary embolism. PULMONARY ARTERIES: No convincing evidence of pulmonary embolism. MEDIASTINUM: Cardiac size is mildly enlarged. Thoracic aorta is essentially unenhanced appearance but normal in course and caliber. No evidence of pericardial effusion. Right chest wall Mediport catheter with tip present in the SVC. LYMPH NODES: No enlarged mediastinal or hilar nodes by size criteria. AIRWAY: Endotracheal tube appropriately positioned. LUNGS: Multifocal areas of consolidation are demonstrated, greatest in conspicuity within the right upper lobe, both superiorly and posteriorly, throughout the right middle lobe, and medial portions of the lower lobes bilaterally, right greater than left. PLEURA: No pneumothorax. Small/trace bilateral pleural effusions. UPPER ABDOMEN: Partial inclusion nasogastric tube within the stomach. Reflux of contrast into the infrahepatic IVC and hepatic veins. . OTHER: There are multiple rib fractures present:   > On the right, there are moderately displaced anterolateral fractures of the third, fourth, fifth, sixth, seventh, eighth, and ninth ribs.   > On the left there are mildly displaced fractures of the left third, fourth, fifth ribs. Vertebral body heights are normal. No compression fracture. Partial inclusion right breast prosthesis. _______________     1. No evidence of pulmonary embolism. 2. Multifocal areas of alveolar consolidation, greatest across the right upper, middle, and lower lobes as described above, potentially pneumonitis related to aspiration given the distribution. 3. Small bilateral pleural effusions without pneumothorax. 4. Multiple bilateral rib fractures as described in detail above. CT ABD PELV W CONT    Result Date: 12/16/2021  EXAM: CT of the abdomen and pelvis INDICATION: Abdominal pain, cardiac arrest, breast cancer, respiratory failure. COMPARISON: None.  TECHNIQUE: Axial CT imaging of the abdomen and pelvis was performed with intravenous contrast. Multiplanar reformats were generated. One or more dose reduction techniques were used on this CT: automated exposure control, adjustment of the mAs and/or kVp according to patient size, and iterative reconstruction techniques. The specific techniques used on this CT exam have been documented in the patient's electronic medical record. Digital Imaging and Communications in Medicine (DICOM) format image data are available to nonaffiliated external healthcare facilities or entities on a secure, media free, reciprocally searchable basis with patient authorization for at least a 12-month period after this study. _______________ FINDINGS: LOWER CHEST: Please see contemporaneously performed CT angiogram of the chest. LIVER, BILIARY: Significantly heterogeneous enhancement pattern to the liver noted with dilated infrahepatic IVC. No biliary dilation. Cholecystectomy. PANCREAS: Normal. SPLEEN: Normal. ADRENALS: Normal. KIDNEYS/URETERS/BLADDER: No hydronephrosis. Symmetric renal enhancement. Urinary bladder decompressed with Infante catheter in situ. PELVIC ORGANS: Hysterectomy. VASCULATURE: Mild diffuse aortobiiliac atherosclerosis without evidence of aneurysmal dilatation. LYMPH NODES: No enlarged lymph nodes. GASTROINTESTINAL TRACT: Nasogastric tube is appropriately positioned within the stomach. Evaluation of small and large bowel is somewhat limited secondary to respiratory motion artifact. No morphology of bowel obstruction. No free intraperitoneal gas. Normal appendix. BONES: No acute or aggressive osseous abnormalities identified. Lower lumbar spondylosis and facet joint osteoarthritis. OTHER: None. _______________     1. Heterogeneous parenchymal enhancement which may be accentuated by contrast bolus timing. These findings may also be accentuated by low cardiac output state/congestive hepatopathy. 2. Nasogastric tube appropriately positioned within the stomach. No evidence of bowel obstruction or free intraperitoneal gas.  3. No hydronephrosis. Urinary bladder decompressed with Infante catheter in situ. XR CHEST PORT    Result Date: 12/18/2021  EXAM: CHEST RADIOGRAPH, SINGLE VIEW CLINICAL INDICATION/HISTORY: et tube      <Additional:  None. COMPARISON: 12/17/2021 TECHNIQUE: Portable frontal view of the chest was obtained. _______________ FINDINGS: SUPPORT DEVICES: ETT is 2.7 cm above the jean, right jugular approach Mediport catheter tip projects at the upper right atrium, nasogastric tube tip projects at the expected position of the gastric body. HEART AND MEDIASTINUM: Cardiac silhouette is mildly prominent. LUNGS AND PLEURAL SPACES: Interstitial and alveolar markings are present throughout the right lung, and in the left perihilar and basilar lung, slightly improved on the right. . No pneumothorax or pleural effusion. BONY THORAX AND SOFT TISSUES: Surgical clips are present in both axillary regions. _______________     Asymmetrical right greater than left pulmonary edema versus pneumonia, slightly improved on the right, otherwise unchanged. XR CHEST PORT    Result Date: 12/17/2021  EXAM: XR CHEST PORT CLINICAL INDICATION/HISTORY: et tube -Additional: Cardiac arrest COMPARISON: 12/16/2021 TECHNIQUE: Portable frontal view of the chest _______________ FINDINGS: SUPPORT DEVICES: Endotracheal tube approximately 2 cm above the jean. Nasogastric tube is below the diaphragm, tip collimated from view. Right chest wall Mediport catheter tip projecting over the superior cavoatrial junction. HEART AND MEDIASTINUM: Stable appearing cardiac size and mediastinal contours. LUNGS AND PLEURAL SPACES: Multifocal areas of consolidation, greatest across the right mid and lower lung zones without evidence of pneumothorax. Small bilateral effusions. BONY THORAX AND SOFT TISSUES: Patient's known multiple bilateral (right greater than left) rib fractures seen to better advantage on cross-sectional imaging. _______________     1.  Support devices in position as above. 2. Multifocal, asymmetric consolidation; pneumonia/pneumonitis similar to prior. 3. Patient's known multiple bilateral rib fractures seen to better advantage on CT imaging. No evidence of pneumothorax. XR CHEST PORT    Result Date: 12/16/2021  EXAM: XR CHEST PORT CLINICAL INDICATION/HISTORY: meets SIRS criteria -Additional: Cardiac arrest, metastatic breast cancer COMPARISON: Radiographs 10/29/2009 TECHNIQUE: Portable frontal view of the chest _______________ FINDINGS: SUPPORT DEVICES: Endotracheal tube is approximately 2.3 cm above the jean. Nasogastric tube is below the diaphragm, tip collimated from view. Right chest wall Mediport catheter tip projecting at the superior cavoatrial junction. HEART AND MEDIASTINUM: Normal appearing cardiac size and mediastinal contours. LUNGS AND PLEURAL SPACES: There are areas of diffuse bilateral pulmonary opacification, right greater than left. No evidence of pneumothorax or definite pleural effusion. BONY THORAX AND SOFT TISSUES: Bilateral axillary surgical clips. No acute osseous abnormality demonstrated. _______________     1. Support devices in appropriate position. 2. Asymmetric, diffuse areas of pulmonary opacification, potentially edema and-or pneumonia. ECHO ADULT COMPLETE    Result Date: 12/16/2021    Left Ventricle: Left ventricle is mildly dilated. Normal wall thickness. See diagram for wall motion findings. Severely reduced left ventricular systolic function with a visually estimated EF of 15 - 20%.   Right Ventricle: Not well visualized. Right ventricle is mildly dilated. Moderately reduced systolic function.   Left Atrium: Left atrium is mildly dilated.   Pulmonary Artery : Estimated Pulmonary Systolic Artery is 33 mmhg. Pulmonary hypertension found to be mild.   Patient is ventilated, cannot use IVC diameter to estimate right atrial pressure. IVC size is normal.       ASSESSMENT/IMPRESSION:   Anoxic brain injury.  I do not think the patient will recover from this. Consider head CT, but she is on pressors, she is not stable enough to get a CT. PLAN/RECOMMENDATIONS:  1. Consider comfort care measures. 2. Consider head CT if stable, but too risky. Please do not hesitate to return with any questions.     Signed:  Matt Ho MD  12/18/2021  10:45 AM

## 2021-12-18 NOTE — PROGRESS NOTES
[address] [facility] Pharmacokinetic Monitoring Service - Vancomycin    Consulting Provider: Saumya Corona   Indication: Sepsis  Target Concentration: Dosing based on anticipated concentration <15 mg/L due to renal impairment/insufficiency  Day of Therapy: 3  Additional Antimicrobials: Zosyn    Pertinent Laboratory Values: Wt Readings from Last 1 Encounters:   12/18/21 85.9 kg (189 lb 6 oz)     Temp Readings from Last 1 Encounters:   12/18/21 99.4 °F (37.4 °C)     No components found for: PROCAL  Estimated Creatinine Clearance: 9.7 mL/min (A) (based on SCr of 5.59 mg/dL (H)).   Recent Labs     12/18/21  0525 12/17/21  0415   WBC 12.4 6.0       Pertinent Cultures:  Culture Date Source Results   12/16/21 Respiratory Occ GPC, GPR, GNR  Few normal macarena   MRSA Nasal Swab: NA    Assessment:  Date/Time Current Dose Concentration Timing of Concentration (h) AUC   12/18/21 1300 Vancomycin 500 mg IV q24h 25.3 mcg/ml ~21h since last dose N/A   Note: Serum concentrations collected for AUC dosing may appear elevated if collected in close proximity to the dose administered, this is not necessarily an indication of toxicity    Plan:  Current dosing regimen is supra-therapeutic  Discontinue current dose until vancomycin level <15 mcg/ml  Repeat vancomycin concentration ordered for 12/19/21 @ 1300   Pharmacy will continue to monitor patient and adjust therapy as indicated    Thank you for the consult,  Eddie Briceñotle [place]  12/18/2021 3:28 PM

## 2021-12-18 NOTE — PROGRESS NOTES
Pulmonary Specialists  Pulmonary, Critical Care, and Sleep Medicine    Name: Yolie Clarke MRN: [id number]   : 1950 Hospital: [facility] [place]    Date: 2021  Room: [address] [place] [address] Note                                              Consult requesting physician: Dr. Evelyn Patel  Reason for Consult: cardiopulmonary  Arrest, hypoxemic respiratory failure, unresponsiveness, metastatic breast cancer, renal failure, metabolic acidosis      IMPRESSIOn   · Cardio pulmonary arrest  · Respiratory failure  · Shock  · Metastatic breast cancer  · Hyperglycemia  · Metabolic acidosis  · Congestive heart failure  Patient Active Problem List   Diagnosis Code    Shock ([address] R57.9    Cardiac arrest ([address] I46.9    Respiratory failure ([address] J96.90    Breast cancer ([facility] C50.919    SAUNDRA (acute kidney injury) ([facility] N17.9    LFT elevation R79.89    Acute hyperglycemia R73.9    Hypokalemia E87.6    PAF (paroxysmal atrial fibrillation) (HCC) I48.0    Asthma J45.909    Anoxic brain damage (HCC) G93.1         PMH I reviewed chart from  [facility]   Past Medical History:   Diagnosis Date    Aneurysm (CMS/HCC)    Breast cancer (CMS/HCC)    Cataract    Cholelithiasis 3/24/2009    Chronic kidney disease (CKD) stage G3a/A1, moderately decreased glomerular filtration rate (GFR) between 45-59 mL/min/1.73 square meter and albuminuria creatinine ratio less than 30 mg/g (CMS/HCC) 10/17/2021    Chronic systolic heart failure (CMS/HCC) 3/24/2009    Dilated cardiomyopathy (CMS/HCC) 5/15/2017    Gastroesophageal reflux disease with esophagitis 2012    Malignant neoplasm of breast (CMS/HCC) 3/24/2009    Malignant neoplasm of upper outer quadrant of breast in female, estrogen receptor negative (CMS/HCC) 2021    Malignant neoplasm of upper outer quadrant of breast in female, estrogen receptor negative (CMS/HCC) 2021    Neuropathy (CMS/HCC)    Paroxysmal atrial fibrillation (CMS/HCC) 1/26/2016    Paroxysmal ventricular tachycardia (CMS/HCC) 9/14/2009    Presence of cardiac pacemaker 9/14/2009   NO Device it was explanted however she does have leads that are capped off in place    Stroke (cerebrum) (CMS/HCC)   2018    Thrombocytopenia (CMS/HCC) 7/27/2017         Code status: full code   Recent visit with palliative care at [place] evaluated  Stopped all tx for cancer stage 4    RECOMMENDATIONS:    Respiratory:   Cardiopulmonary arrest initially in PEA no pulmonary embolus. Multifactorial has metastatic breast cancer cardiomyopathy. Continue full ventilatory support. ABG 7.22/30/350/95  /27/100%/peep 5 wean to 80 %   contineu weaning down Fio2   No change in PEEP  bicabonate for acidosis   CT of the chest evaluated. No PE aspiration pneumonia possible infiltrate  abx  and bronchodilators ordered    Keep SPO2 >=92%. HOB 30 degree elevation all the time. Aggressive pulmonary toileting. Aspiration precautions. Incentive spirometry. CVS: cardiopulmonary arrest history of cardiomyopathy and pacemaker. 12/16   Left Ventricle: Left ventricle is mildly dilated. Normal wall thickness. See diagram for wall motion findings. Severely reduced left ventricular systolic function with a visually estimated EF of 15 - 20%.   Right Ventricle: Not well visualized. Right ventricle is mildly dilated. Moderately reduced systolic function.   Left Atrium: Left atrium is mildly dilated.   Pulmonary Artery : Estimated Pulmonary Systolic Artery is 33 mmhg. Pulmonary hypertension found to be mild.   Patient is ventilated, cannot use IVC diameter to estimate right atrial pressure. IVC size is normal.       On Eliquis and Lasix and digoxin at home suspect has low ejection fraction? Echo pending. Status post cardiac arrest over 30 minutes. Continue epi continue Levophed if worsen add vasopressin. Continue Albumin's.   IV fluid prn      ID: WBC normal, no fever  resp culure normal respiratory macarena Aspiration pneumonia  Stop vancomycin  Blood culure negative   Culture negative   Sepsis less likely shock most likely due to cardiopulmonary arrest from primary cardiac issues and complications of breast cancer  Panculture. Normal White blood cells patient is off all chemotherapy. Broad-spectrum antibiotics vancomycin and Zosyn stop tomoorw if no MRSA vanc    Sepsis bundle and protocol followed. Follow serial lactic acid, frequent BMP check, fluid resuscitation. Follow cultures. Deescalate antibiotic when appropriate. Hematology/Oncology: Stage IV breast cancer recently stopped chemotherapy due to ineffective treatment was talking to palliative care. We will consult oncology and palliative care. Monitor hemoglobin white blood cells and coagulation profile patient was on Eliquis at home no PE. Post prolonged cardiac arrest will be in high risk of bleeding into chest post prolonged CPR will start low-dose Lovenox and reevaluate    Renal:  Very poor urine output on bicarbonate drip  Worsening renal failure due to cardiac arrest not a good candidate for HD  Unresponsive suspect anoxia    Acute on chronic renal failure with severe hyperkalemia, hyperglycemia, electrolyte derangement, dehydration, metabolic acidosis. Replace potassium and start insulin drip after. Continue hydration. Electrolyte replacement protocol per ICU placed start. Monitor kidney function. GI/: Shock liver no acute abnormalities on CT of the abdomen and pelvis no perforation    Endocrine: Monitor BS. SSI. Acute hyperglycemia possible DKA anion gap mildly elevated. Follow. Start insulin drip after replacement of potassium check magnesium phosphorus    Neurology: anoxic brain injury post prolonged CPR  Anoxic brain injury    Off sedation pupils minimally reactive, no corneal, no gag  RR above the evnt     CT of the head evaluated no acute CVA or bleeding.   Unresponsive post prolonged cardiac arrest pupils nonreactive 3 mm I suspect. No sedation given I suspect anoxic brain injury. If uncomfortable or unable to ventilate we will start as needed low-dose of Versed and fentanyl        Toxicology: None    Pain/Sedation: RN fentanyl for pain    Skin/Wound: none    Electrolytes: Replace electrolytes per ICU electrolyte replacement protocol. IVF: DKA protocol    Nutrition: P.o. Prophylaxis: DVT Prophylaxis: SCD/Lovenox if no bleeding will resume Eliquis GI Prophylaxis:     Restraints: none  Responsive after cardiac arrest    Lines/Tubes: PIV  ETT: In place  OGT/NGT: Place once stable  Patient has port we are using it for pressors    Infante: Place Infante (Medically necessary for strict input/output monitoring in critically ill patient, will remove it when not needed. Infante bundle followed). Other:    PT/OT eval and treat. OOB. Advance Directive/Palliative Care: DNR changed on 17/17 considering comfort care  I consulted palliative care and evaluated recent on December 10 palliative care consultation patient did not want any extreme measurement but she did not sign officially DNR/DNI or declared hospice. So was in the gray zone. Code intubated CPR prolonged. Prognosis extremely poor I had long discussion with sister she is going to discuss it with family. I do not recommend to repeat CPR but final decision from family pending    Will defer respective systems problem management to primary and other respective consultant and follow patient in ICU with primary and other medical team.  Further recommendations will be based on the patient's response to recommended treatment and results of the investigation ordered. Quality Care: PPI, DVT prophylaxis, HOB elevated, Infection control all reviewed and addressed. Care of plan d/w RN, RT, MDR, hospitalist team.    High complexity decision making was performed during the evaluation of this patient at high risk for decompensation with multiple organ involvement.     Total critical care time spent rendering care exclusive of procedures/family discussion/coordination of care: 48 minutes. Discussed with family, neurology, palliative care   Subjective/History of Present Illness:     Patient is a 73 y.o. female with PMHx significant for congestive heart failure, atrial fibrillation, pacemaker, breast  cancer stage IV recurrence of the disease [place] of treatment seeing palliative care CODE STATUS was not clarified in the chart, on anticoagulation with Eliquis. Patient was recently seen by palliative care all evaluated note she said she does not want any extreme measures, however she was not declared fully DNR/DNI on hospice. Based on the last note. Patient was unwell and she had advanced breast cancer with recurrence of the disease that started 27 years ago, recently stopped all the treatment as it was ineffective, patient had cardiac arrest at home. EMS on arrival started ACLS protocol for PEA arrest.  Epi was given. Also V. jose j reported 3 times defibrillated at home. ROSC achieved en route. Cardiac arrest again in ambulance given bicarbonate amiodarone Narcan. Blood sugar was stable 135. Patient was intubated. On arrival to emergency room still chest compressions ongoing. No pulse. Another 25 minutes of CPR in the emergency room. Return of pulse. Patient is currently on 2 of epi, Levophed 8, intubated, blood pressure systolic 179X, unresponsive with no sedation. Pupils no response. No spontaneous movement. CT of the head evaluated CTA of the chest no PE CT of the abdomen no acute GI issues. Known metastatic breast cancer to lungs. I met with sister at the bedside who is healthcare proxy. Continue current care. I have consulted palliative care. Family will discuss further steps    12/18/2021:  She remains in intensive care unit in bed 9.   Intubated not on sedation  Shock and renal failure worsening   Family discussion no neuro recover  Considering comfort care  I/O last 24 hrs:     Intake/Output Summary (Last 24 hours) at 12/18/2021 0843  Last data filed at 12/18/2021 4957  Gross per 24 hour   Intake 2715.26 ml   Output    Net 2715.26 ml         The patient is critically ill and can not provide additional history due to Unconsciousness    History taken from sister and EMR Ed     Review of Systems:    ROS not obtained due to patient factor. Allergies   Allergen Reactions    Latex Itching    Codeine Itching    Lisinopril Cough    Venlafaxine Other (comments)     confusion      Past Medical History:   Diagnosis Date    Asthma     HTN (hypertension)     Metastatic breast cancer (HCC)     PAF (paroxysmal atrial fibrillation) (HCC)       No past surgical history on file. Social History     Tobacco Use    Smoking status: Not on file    Smokeless tobacco: Not on file   Substance Use Topics    Alcohol use: Not on file      No family history on file.    Prior to Admission medications    Not on File     Current Facility-Administered Medications   Medication Dose Route Frequency    white petrolatum-mineral oiL (LACRILUBE S.O.P.) ointment   Both Eyes Q12H    piperacillin-tazobactam (ZOSYN) 3.375 g in 0.9% sodium chloride (MBP/ADV) 100 mL MBP  3.375 g IntraVENous Q12H    enoxaparin (LOVENOX) injection 30 mg  30 mg SubCUTAneous Q24H    vancomycin (VANCOCIN) 500 mg in 0.9% sodium chloride (MBP/ADV) 100 mL MBP  500 mg IntraVENous Q24H    NOREPINephrine (LEVOPHED) 8 mg in 5% dextrose 250mL (32 mcg/mL) infusion  0.5-30 mcg/min IntraVENous TITRATE    dextrose 10% infusion  50 mL/hr IntraVENous CONTINUOUS    0.9% sodium chloride infusion  25 mL/hr IntraVENous CONTINUOUS    pantoprazole (PROTONIX) 40 mg in 0.9% sodium chloride 10 mL injection  40 mg IntraVENous DAILY    sodium bicarbonate (8.4%) 150 mEq in dextrose 5% 1,000 mL infusion   IntraVENous CONTINUOUS    Vancomycin - Rx to dose and monitor  1 Each Other Rx Dosing/Monitoring    albumin human 25% (BUMINATE) solution 25 g  25 g IntraVENous Q6H         Objective:   Vital Signs:    Visit Vitals  BP (!) 100/40   Pulse 88   Temp 99.5 °F (37.5 °C)   Resp (!) 36   Ht 5' 2\" (1.575 m)   Wt 77.1 kg (170 lb)   SpO2 100%   BMI 31.09 kg/m²       O2 Device: Endotracheal tube,Ventilator       Temp (24hrs), Av.2 °F (37.9 °C), Min:98.3 °F (36.8 °C), Max:102.6 °F (39.2 °C)       Intake/Output:   Last shift:      No intake/output data recorded. Last 3 shifts:  1901 -  0700  In: 5154.8 [I.V.:5154.8]  Out: 50 [Urine:50]      Intake/Output Summary (Last 24 hours) at 2021 0843  Last data filed at 2021 0651  Gross per 24 hour   Intake 2715.26 ml   Output    Net 2715.26 ml       Last 3 Recorded Weights in this Encounter    21 1058 21 1521   Weight: 77.1 kg (170 lb) 77.1 kg (170 lb)           Mode Rate Tidal Volume Pressure FiO2 PEEP   Assist control,VC+   400 ml    60 % 5 cm H20     Peak airway pressure: 21 cm H2O    Plateau pressure:     Tidal volume:    Minute ventilation: 11.3 l/min     Recent Labs     21  0450 21  0447 21  1627 21  1123 21  1123   PHI 7.22* 7.40  --   --  7.26*   PCO2I 30.8* 29.0*  --   --  44.9   PO2I 350* 144*  --   --  378*   HCO3I 12.6* 17.9* 26.3*   < > 19.9*   FIO2  --   --   --   --  100   FIO2I 100 80  --   --   --     < > = values in this interval not displayed. Physical Exam:     General/Neurology: intubated  nonsedated unresponsive pupil nonreactive, respiratory rate above the vent, withdrawal to painful stimuli, chronically  ill-appearing, not on any sedation over 24 hsr  Neuro: no resposne to painful stimuli, pupils 2 mm non rective, no corneal refex, no gaga, RR above the vent  Head:   Normocephalic, without obvious abnormality, atraumatic. Eye:   swollen No scleral icterus, no pallor, no cyanosis. Nose:   No sinus tenderness  Throat:  Lips, mucosa, and tongue normal. No oral thrush. Neck:   Supple, symmetric. No lymphadenopathy.  Trachea midline  Lung: Moderate air entry bilateral equal bila  No rales at the bases  No rhonchi. No wheezing. No stridors. No prolongded expiration. No accessory muscle use. Heart:   Regular rate & rhythm. S1 S2 present. No murmur. No JVD. Abdomen:  Soft. NT. ND. +BS. No masses. Extremities:  1 plus pedal edema. No cyanosis. No clubbing. Pulses: 2+ and symmetric in DP. Capillary refill: normal  Lymphatic:  No cervical or supraclavicular palpable lymphadenopathy. Musculoskeletal: No joint swelling. No tenderness. Skin:   Color, texture, turgor normal. No rashes or lesions. Data:       Recent Results (from the past 24 hour(s))   CARDIAC PANEL,(CK, CKMB & TROPONIN)    Collection Time: 12/17/21 11:17 AM   Result Value Ref Range    CK - MB  ng/ml     Original order canceled by laboratory, test reordered by laboratory with the purpose of combining it with other laboratory orders. CK-MB Index  %     Original order canceled by laboratory, test reordered by laboratory with the purpose of combining it with other laboratory orders. CK  U/L     Original order canceled by laboratory, test reordered by laboratory with the purpose of combining it with other laboratory orders.     Troponin-High Sensitivity 60,739 (HH) 0 - 54 ng/L   POTASSIUM    Collection Time: 12/17/21 11:17 AM   Result Value Ref Range    Potassium 5.0 3.5 - 5.5 mmol/L   CKMB PROFILE    Collection Time: 12/17/21 11:17 AM   Result Value Ref Range     (H) 26 - 192 U/L    CK - MB 6.5 (H) <3.6 ng/ml    CK-MB Index 0.9 0.0 - 4.0 %   GLUCOSE, POC    Collection Time: 12/17/21 12:19 PM   Result Value Ref Range    Glucose (POC) 65 (L) 70 - 110 mg/dL   GLUCOSE, POC    Collection Time: 12/17/21 12:57 PM   Result Value Ref Range    Glucose (POC) 131 (H) 70 - 110 mg/dL   GLUCOSE, POC    Collection Time: 12/17/21  4:58 PM   Result Value Ref Range    Glucose (POC) 57 (L) 70 - 110 mg/dL   GLUCOSE, POC    Collection Time: 12/17/21  5:43 PM   Result Value Ref Range    Glucose (POC) 54 (LL) 70 - 110 mg/dL   GLUCOSE, POC    Collection Time: 12/17/21  6:14 PM   Result Value Ref Range    Glucose (POC) 126 (H) 70 - 110 mg/dL   GLUCOSE, POC    Collection Time: 12/17/21 11:55 PM   Result Value Ref Range    Glucose (POC) 94 70 - 110 mg/dL   BLOOD GAS, ARTERIAL POC    Collection Time: 12/18/21  4:50 AM   Result Value Ref Range    Device: ADULT VENT      FIO2 (POC) 100 %    pH (POC) 7.22 (LL) 7.35 - 7.45      pCO2 (POC) 30.8 (L) 35.0 - 45.0 MMHG    pO2 (POC) 350 (H) 80 - 100 MMHG    HCO3 (POC) 12.6 (L) 22 - 26 MMOL/L    sO2 (POC) 99.9 (H) 92 - 97 %    Base deficit (POC) 13.9 mmol/L    Mode Volume Control      Tidal volume 400 ml    Set Rate 30 bpm    PEEP/CPAP (POC) 5 cmH2O    PIP (POC) 21      Allens test (POC) Positive      Site RIGHT RADIAL      Patient temp.  98.6      Specimen type (POC) ARTERIAL      Performed by Teddy Menjivar    GLUCOSE, POC    Collection Time: 12/18/21  5:24 AM   Result Value Ref Range    Glucose (POC) 57 (L) 70 - 110 mg/dL   RENAL FUNCTION PANEL    Collection Time: 12/18/21  5:25 AM   Result Value Ref Range    Sodium 143 136 - 145 mmol/L    Potassium 4.6 3.5 - 5.5 mmol/L    Chloride 97 (L) 100 - 111 mmol/L    CO2 14 (L) 21 - 32 mmol/L    Anion gap 32 (H) 3.0 - 18 mmol/L    Glucose 56 (L) 74 - 99 mg/dL    BUN 40 (H) 7.0 - 18 MG/DL    Creatinine 5.59 (H) 0.6 - 1.3 MG/DL    BUN/Creatinine ratio 7 (L) 12 - 20      GFR est AA 9 (L) >60 ml/min/1.73m2    GFR est non-AA 8 (L) >60 ml/min/1.73m2    Calcium 7.5 (L) 8.5 - 10.1 MG/DL    Phosphorus 7.6 (H) 2.5 - 4.9 MG/DL    Albumin 4.1 3.4 - 5.0 g/dL   CBC WITH AUTOMATED DIFF    Collection Time: 12/18/21  5:25 AM   Result Value Ref Range    WBC 12.4 4.6 - 13.2 K/uL    RBC 2.66 (L) 4.20 - 5.30 M/uL    HGB 8.2 (L) 12.0 - 16.0 g/dL    HCT 28.0 (L) 35.0 - 45.0 %    .3 (H) 78.0 - 100.0 FL    MCH 30.8 24.0 - 34.0 PG    MCHC 29.3 (L) 31.0 - 37.0 g/dL    RDW 20.3 (H) 11.6 - 14.5 %    PLATELET 561 (L) 521 - 420 K/uL MPV 10.3 9.2 - 11.8 FL    NRBC 2.1 (H) 0  WBC    ABSOLUTE NRBC 0.26 (H) 0.00 - 0.01 K/uL    NEUTROPHILS 52 40 - 73 %    BAND NEUTROPHILS 24 (H) 0 - 5 %    LYMPHOCYTES 6 (L) 21 - 52 %    MONOCYTES 5 3 - 10 %    EOSINOPHILS 0 0 - 5 %    BASOPHILS 0 0 - 2 %    METAMYELOCYTES 11 (H) 0 %    MYELOCYTES 2 (H) 0 %    IMMATURE GRANULOCYTES 0 %    ABS. NEUTROPHILS 9.4 (H) 1.8 - 8.0 K/UL    ABS. LYMPHOCYTES 0.7 (L) 0.9 - 3.6 K/UL    ABS. MONOCYTES 0.6 0.05 - 1.2 K/UL    ABS. EOSINOPHILS 0.0 0.0 - 0.4 K/UL    ABS. BASOPHILS 0.0 0.0 - 0.1 K/UL    ABS. IMM.  GRANS. 0.0 K/UL    DF MANUAL      PLATELET COMMENTS DECREASED PLATELETS      RBC COMMENTS ANISOCYTOSIS  1+        RBC COMMENTS CHARLIE CELLS  2+        RBC COMMENTS SCHISTOCYTES  1+        RBC COMMENTS POIKILOCYTOSIS  1+       GLUCOSE, POC    Collection Time: 12/18/21  5:50 AM   Result Value Ref Range    Glucose (POC) 134 (H) 70 - 110 mg/dL         Chemistry Recent Labs     12/18/21  0525 12/17/21  1117 12/17/21  0742 12/17/21  0150 12/17/21  0150 12/16/21  2242 12/16/21  1631 12/16/21  1630 12/16/21  1250   GLU 56*  --  98  --  82  --   --    < > 533*     --  144  --  146*  --   --    < > 134*   K 4.6 5.0 3.1*   < > 2.7*  --   --    < > 2.5*   CL 97*  --  101  --  103  --   --    < > 91*   CO2 14*  --  19*  --  18*  --   --    < > 25   BUN 40*  --  28*  --  26*  --   --    < > 13   CREA 5.59*  --  3.21*  --  2.94*  --   --    < > 1.55*   CA 7.5*  --  8.9  --  8.6  --   --    < > 9.0   MG  --   --   --   --  1.8 2.2  --   --  2.1   PHOS 7.6*  --  2.0*  --   --   --  3.6   < >  --    AGAP 32*  --  24*  --  25*  --   --    < > 18   BUCR 7*  --  9*  --  9*  --   --    < > 8*   AP  --   --   --   --   --   --   --   --  135*   TP  --   --   --   --   --   --   --   --  5.7*   ALB 4.1  --  3.6  --   --   --  2.8*   < > 2.3*   GLOB  --   --   --   --   --   --   --   --  3.4   AGRAT  --   --   --   --   --   --   --   --  0.7*    < > = values in this interval not displayed. Lactic Acid No results found for: LAC  No results for input(s): LAC in the last 72 hours. Liver Enzymes Protein, total   Date Value Ref Range Status   12/16/2021 5.7 (L) 6.4 - 8.2 g/dL Final     Albumin   Date Value Ref Range Status   12/18/2021 4.1 3.4 - 5.0 g/dL Final     Globulin   Date Value Ref Range Status   12/16/2021 3.4 2.0 - 4.0 g/dL Final     A-G Ratio   Date Value Ref Range Status   12/16/2021 0.7 (L) 0.8 - 1.7   Final     Alk. phosphatase   Date Value Ref Range Status   12/16/2021 135 (H) 45 - 117 U/L Final     Recent Labs     12/18/21  0525 12/17/21  0742 12/16/21  1631 12/16/21  1630 12/16/21  1250   TP  --   --   --   --  5.7*   ALB 4.1 3.6 2.8*   < > 2.3*   GLOB  --   --   --   --  3.4   AGRAT  --   --   --   --  0.7*   AP  --   --   --   --  135*    < > = values in this interval not displayed. CBC w/Diff Recent Labs     12/18/21  0525 12/17/21  0415 12/16/21  1630   WBC 12.4 6.0 7.7   RBC 2.66* 3.22* 4.52   HGB 8.2* 9.9* 13.4   HCT 28.0* 32.9* 46.0*   * 164 240   GRANS 52 39* 59   LYMPH 6* 14* 20*   EOS 0 0 0        Cardiac Enzymes Lab Results   Component Value Date/Time    CPK  12/17/2021 11:17 AM     Original order canceled by laboratory, test reordered by laboratory with the purpose of combining it with other laboratory orders.  (H) 12/17/2021 11:17 AM    CKMB  12/17/2021 11:17 AM     Original order canceled by laboratory, test reordered by laboratory with the purpose of combining it with other laboratory orders. CKMB 6.5 (H) 12/17/2021 11:17 AM    CKND1  12/17/2021 11:17 AM     Original order canceled by laboratory, test reordered by laboratory with the purpose of combining it with other laboratory orders.     CKND1 0.9 12/17/2021 11:17 AM        BNP No results found for: BNP, BNPP, XBNPT     Coagulation Recent Labs     12/16/21  1630   PTP 21.4*   INR 2.0*         Thyroid  Lab Results   Component Value Date/Time    TSH 1.00 10/30/2009 02:17 AM No results found for: T4     Urinalysis Lab Results   Component Value Date/Time    Color DARK YELLOW 12/16/2021 12:50 PM    Appearance CLEAR 12/16/2021 12:50 PM    Specific gravity 1.017 12/16/2021 12:50 PM    pH (UA) 5.5 12/16/2021 12:50 PM    Protein TRACE (A) 12/16/2021 12:50 PM    Glucose Negative 12/16/2021 12:50 PM    Ketone Negative 12/16/2021 12:50 PM    Bilirubin Negative 12/16/2021 12:50 PM    Urobilinogen 1.0 12/16/2021 12:50 PM    Nitrites Negative 12/16/2021 12:50 PM    Leukocyte Esterase Negative 12/16/2021 12:50 PM    Epithelial cells 1+ 12/16/2021 12:50 PM    Bacteria FEW (A) 12/16/2021 12:50 PM    WBC 0 to 3 12/16/2021 12:50 PM    RBC 0 to 3 12/16/2021 12:50 PM        Micro  Recent Labs     12/16/21 2040   CULT PENDING     Recent Labs     12/16/21 2040   CULT PENDING          Culture data during this hospitalization. All Micro Results     Procedure Component Value Units Date/Time    CULTURE, BLOOD [[id number]] Collected: 12/16/21 1630    Order Status: Completed Specimen: Blood Updated: 12/18/21 0713    CULTURE, RESPIRATORY/SPUTUM/BRONCH Zygmunt Liner [[id number]] Collected: 12/16/21 2040    Order Status: Completed Specimen: Sputum from Transtracheal Aspirate Updated: 12/18/21 0000     Special Requests: NO SPECIAL REQUESTS        GRAM STAIN 1+ WBCS SEEN         NO EPITHELIAL CELLS SEEN               OCCASIONAL GRAM POSITIVE COCCI IN PAIRS                  OCCASIONAL GRAM POSITIVE RODS                  OCCASIONAL APPARENT GRAM NEGATIVE RODS           Culture result: PENDING    COVID-19 RAPID TEST [[id number]] Collected: 12/16/21 1704    Order Status: Completed Specimen: Nasopharyngeal Updated: 12/16/21 1750     Specimen source Nasopharyngeal        COVID-19 rapid test Not detected        Comment: Rapid [employer] ID Now       Rapid NAAT:  The specimen is NEGATIVE for SARS-CoV-2, the novel coronavirus associated with COVID-19.        Negative results should be treated as presumptive and, if inconsistent with clinical signs and symptoms or necessary for patient management, should be tested with an alternative molecular assay. Negative results do not preclude SARS-CoV-2 infection and should not be used as the sole basis for patient management decisions. This test has been authorized by the FDA under an Emergency Use Authorization (EUA) for use by authorized laboratories. Fact sheet for Healthcare Providers: Fitmodate.co.nz  Fact sheet for Patients: BTIG.co.nz       Methodology: Isothermal Nucleic Acid Amplification         CULTURE, BLOOD [[id number]] Collected: 12/16/21 1704    Order Status: Canceled Specimen: Blood     CULTURE, BLOOD [[id number]] Collected: 12/16/21 1445    Order Status: Canceled Specimen: Blood              CT HEAD WO CONT    Result Date: 12/16/2021  EXAM: CT head INDICATION: Cardiac arrest, metastatic breast cancer COMPARISON: None. TECHNIQUE: Axial CT imaging of the head was performed without intravenous contrast. Standard multiplanar coronal and sagittal reformatted images were obtained and are included in interpretation. One or more dose reduction techniques were used on this CT: automated exposure control, adjustment of the mAs and/or kVp according to patient size, and iterative reconstruction techniques. The specific techniques used on this CT exam have been documented in the patient's electronic medical record. Digital Imaging and Communications in Medicine (DICOM) format image data are available to nonaffiliated external healthcare facilities or entities on a secure, media free, reciprocally searchable basis with patient authorization for at least a 12-month period after this study. _______________ FINDINGS: BRAIN AND POSTERIOR FOSSA: Mild cortical and cerebellar volume loss is present. The ventricular size and configuration is within normal limits. Basilar cisterns are patent.  Mild periventricular white matter low-attenuation is present. There is no intracranial hemorrhage, mass effect, or midline shift. The gray-white matter differentiation is within normal limits. EXTRA-AXIAL SPACES AND MENINGES: There are no abnormal extra-axial fluid collections. CALVARIUM: Intact. SINUSES: Layering fluid present within the sphenoid sinus with mild mucosal thickening of the anterior and posterior ethmoid air cells. OTHER: Ocular lenses are surgically replaced _______________     1. No acute intracranial abnormality demonstrated. 2. Subcortical and periventricular white matter low-attenuation as can be seen with sequela of chronic ischemic microvascular change 3. Nonspecific paranasal mucosal thickening/fluid    CTA CHEST W OR W WO CONT    Result Date: 12/16/2021  EXAM: CTA Chest INDICATION: Cardiac arrest, patient with history of breast cancer COMPARISON: No prior study. TECHNIQUE: Axial CT imaging from the thoracic inlet through the diaphragm with intravenous contrast utilizing CTA study for pulmonary artery evaluation. Coronal and sagittal MIP reformations were generated at a separate workstation. One or more dose reduction techniques were used on this CT: automated exposure control, adjustment of the mAs and/or kVp according to patient size, and iterative reconstruction techniques. The specific techniques used on this CT exam have been documented in the patient's electronic medical record. Digital Imaging and Communications in Medicine (DICOM) format image data are available to nonaffiliated external healthcare facilities or entities on a secure, media free, reciprocally searchable basis with patient authorization for at least a 12-month period after this study. _______________ FINDINGS: EXAM QUALITY: Overall exam quality is adequate. Pulmonary arterial enhancement is adequate. Although the exam is degraded by motion artifact, distal filter be diagnostic for the exclusion of pulmonary embolism.  PULMONARY ARTERIES: No convincing evidence of pulmonary embolism. MEDIASTINUM: Cardiac size is mildly enlarged. Thoracic aorta is essentially unenhanced appearance but normal in course and caliber. No evidence of pericardial effusion. Right chest wall Mediport catheter with tip present in the SVC. LYMPH NODES: No enlarged mediastinal or hilar nodes by size criteria. AIRWAY: Endotracheal tube appropriately positioned. LUNGS: Multifocal areas of consolidation are demonstrated, greatest in conspicuity within the right upper lobe, both superiorly and posteriorly, throughout the right middle lobe, and medial portions of the lower lobes bilaterally, right greater than left. PLEURA: No pneumothorax. Small/trace bilateral pleural effusions. UPPER ABDOMEN: Partial inclusion nasogastric tube within the stomach. Reflux of contrast into the infrahepatic IVC and hepatic veins. . OTHER: There are multiple rib fractures present:   > On the right, there are moderately displaced anterolateral fractures of the third, fourth, fifth, sixth, seventh, eighth, and ninth ribs.   > On the left there are mildly displaced fractures of the left third, fourth, fifth ribs. Vertebral body heights are normal. No compression fracture. Partial inclusion right breast prosthesis. _______________     1. No evidence of pulmonary embolism. 2. Multifocal areas of alveolar consolidation, greatest across the right upper, middle, and lower lobes as described above, potentially pneumonitis related to aspiration given the distribution. 3. Small bilateral pleural effusions without pneumothorax. 4. Multiple bilateral rib fractures as described in detail above. CT ABD PELV W CONT    Result Date: 12/16/2021  EXAM: CT of the abdomen and pelvis INDICATION: Abdominal pain, cardiac arrest, breast cancer, respiratory failure. COMPARISON: None. TECHNIQUE: Axial CT imaging of the abdomen and pelvis was performed with intravenous contrast. Multiplanar reformats were generated.  One or more dose reduction techniques were used on this CT: automated exposure control, adjustment of the mAs and/or kVp according to patient size, and iterative reconstruction techniques. The specific techniques used on this CT exam have been documented in the patient's electronic medical record. Digital Imaging and Communications in Medicine (DICOM) format image data are available to nonaffiliated external healthcare facilities or entities on a secure, media free, reciprocally searchable basis with patient authorization for at least a 12-month period after this study. _______________ FINDINGS: LOWER CHEST: Please see contemporaneously performed CT angiogram of the chest. LIVER, BILIARY: Significantly heterogeneous enhancement pattern to the liver noted with dilated infrahepatic IVC. No biliary dilation. Cholecystectomy. PANCREAS: Normal. SPLEEN: Normal. ADRENALS: Normal. KIDNEYS/URETERS/BLADDER: No hydronephrosis. Symmetric renal enhancement. Urinary bladder decompressed with Infante catheter in situ. PELVIC ORGANS: Hysterectomy. VASCULATURE: Mild diffuse aortobiiliac atherosclerosis without evidence of aneurysmal dilatation. LYMPH NODES: No enlarged lymph nodes. GASTROINTESTINAL TRACT: Nasogastric tube is appropriately positioned within the stomach. Evaluation of small and large bowel is somewhat limited secondary to respiratory motion artifact. No morphology of bowel obstruction. No free intraperitoneal gas. Normal appendix. BONES: No acute or aggressive osseous abnormalities identified. Lower lumbar spondylosis and facet joint osteoarthritis. OTHER: None. _______________     1. Heterogeneous parenchymal enhancement which may be accentuated by contrast bolus timing. These findings may also be accentuated by low cardiac output state/congestive hepatopathy. 2. Nasogastric tube appropriately positioned within the stomach. No evidence of bowel obstruction or free intraperitoneal gas. 3. No hydronephrosis.  Urinary bladder decompressed with Infante catheter in situ. XR CHEST PORT    Result Date: 12/16/2021  EXAM: XR CHEST PORT CLINICAL INDICATION/HISTORY: meets SIRS criteria -Additional: Cardiac arrest, metastatic breast cancer COMPARISON: Radiographs 10/29/2009 TECHNIQUE: Portable frontal view of the chest _______________ FINDINGS: SUPPORT DEVICES: Endotracheal tube is approximately 2.3 cm above the jean. Nasogastric tube is below the diaphragm, tip collimated from view. Right chest wall Mediport catheter tip projecting at the superior cavoatrial junction. HEART AND MEDIASTINUM: Normal appearing cardiac size and mediastinal contours. LUNGS AND PLEURAL SPACES: There are areas of diffuse bilateral pulmonary opacification, right greater than left. No evidence of pneumothorax or definite pleural effusion. BONY THORAX AND SOFT TISSUES: Bilateral axillary surgical clips. No acute osseous abnormality demonstrated. _______________     1. Support devices in appropriate position. 2. Asymmetric, diffuse areas of pulmonary opacification, potentially edema and-or pneumonia. PFT       Ultrasound       LE Doppler       ECHO       Images report reviewed by me:  CT (Most Recent) (CT chest reviewed by me) Results from Hospital Encounter encounter on 12/16/21    CTA CHEST W OR W WO CONT    Narrative  EXAM: CTA Chest    INDICATION: Cardiac arrest, patient with history of breast cancer    COMPARISON: No prior study. TECHNIQUE: Axial CT imaging from the thoracic inlet through the diaphragm with  intravenous contrast utilizing CTA study for pulmonary artery evaluation. Coronal and sagittal MIP reformations were generated at a separate workstation. One or more dose reduction techniques were used on this CT: automated exposure  control, adjustment of the mAs and/or kVp according to patient size, and  iterative reconstruction techniques.   The specific techniques used on this CT  exam have been documented in the patient's electronic medical record. Digital  Imaging and Communications in Medicine (DICOM) format image data are available  to nonaffiliated external healthcare facilities or entities on a secure, media  free, reciprocally searchable basis with patient authorization for at least a  12-month period after this study. _______________    FINDINGS:    EXAM QUALITY: Overall exam quality is adequate. Pulmonary arterial enhancement  is adequate. Although the exam is degraded by motion artifact, distal filter be  diagnostic for the exclusion of pulmonary embolism. PULMONARY ARTERIES: No convincing evidence of pulmonary embolism. MEDIASTINUM: Cardiac size is mildly enlarged. Thoracic aorta is essentially  unenhanced appearance but normal in course and caliber. No evidence of  pericardial effusion. Right chest wall Mediport catheter with tip present in the  SVC. LYMPH NODES: No enlarged mediastinal or hilar nodes by size criteria. AIRWAY: Endotracheal tube appropriately positioned. LUNGS: Multifocal areas of consolidation are demonstrated, greatest in  conspicuity within the right upper lobe, both superiorly and posteriorly,  throughout the right middle lobe, and medial portions of the lower lobes  bilaterally, right greater than left. PLEURA: No pneumothorax. Small/trace bilateral pleural effusions. UPPER ABDOMEN: Partial inclusion nasogastric tube within the stomach. Reflux of  contrast into the infrahepatic IVC and hepatic veins. .    OTHER:  There are multiple rib fractures present:  > On the right, there are moderately displaced anterolateral fractures of the  third, fourth, fifth, sixth, seventh, eighth, and ninth ribs.  > On the left there are mildly displaced fractures of the left third, fourth,  fifth ribs. Vertebral body heights are normal. No compression fracture. Partial inclusion right breast prosthesis. _______________    Impression  1. No evidence of pulmonary embolism.   2. Multifocal areas of alveolar consolidation, greatest across the right upper,  middle, and lower lobes as described above, potentially pneumonitis related to  aspiration given the distribution. 3. Small bilateral pleural effusions without pneumothorax. 4. Multiple bilateral rib fractures as described in detail above. CXR reviewed by me:  XR (Most Recent). CXR  reviewed by me and compared with previous CXR Results from Hospital Encounter encounter on 12/16/21    XR CHEST PORT    Narrative  EXAM: CHEST RADIOGRAPH, SINGLE VIEW    CLINICAL INDICATION/HISTORY: et tube  <Additional:  None. COMPARISON: 12/17/2021    TECHNIQUE: Portable frontal view of the chest was obtained.    _______________    FINDINGS:    SUPPORT DEVICES: ETT is 2.7 cm above the jean, right jugular approach Mediport  catheter tip projects at the upper right atrium, nasogastric tube tip projects  at the expected position of the gastric body. HEART AND MEDIASTINUM: Cardiac silhouette is mildly prominent. LUNGS AND PLEURAL SPACES: Interstitial and alveolar markings are present  throughout the right lung, and in the left perihilar and basilar lung, slightly  improved on the right. . No pneumothorax or pleural effusion. BONY THORAX AND SOFT TISSUES: Surgical clips are present in both axillary  regions. _______________    Impression  Asymmetrical right greater than left pulmonary edema versus pneumonia, slightly  improved on the right, otherwise unchanged. ·Please note: Voice-recognition software may have been used to generate this report, which may have resulted in some phonetic-based errors in grammar and contents. Even though attempts were made to correct all the mistakes, some may have been missed, and remained in the body of the document.       Peter Goldberg MD  12/18/2021

## 2021-12-18 NOTE — PROGRESS NOTES
1930- Report and care received, assessment completed per flow sheet. Intubated, unresponsive, no gag, no corneal reflex. 0000- Reassessment without change. 0400- Reassessment without change.

## 2021-12-18 NOTE — PROGRESS NOTES
Bedside shift change report given to Paul Campos RN (oncoming nurse) by Rjei Belcher RN (offgoing nurse). Report included the following information SBAR, Kardex and MAR.

## 2021-12-19 NOTE — PROGRESS NOTES
Received pt from Héctor Feliciano RN. Pt is unresponsive. Maxed out on pressors. Pts condition is decreasing. Per offgoing nurse Doctor will not be adding anymore pressors until morning for plans of extubation and comfort care. Family made aware. 0715   Bedside and Verbal shift change report given to INGRIS Hinojosa RN (oncoming nurse) by Gracy Hernandez. Jennifer Capellan RN (offgoing nurse). Report included the following information SBAR, Kardex, MAR and Recent Results.

## 2021-12-19 NOTE — PROGRESS NOTES
Assumed pt care approx 0730. Assessment performed, see flow sheet. Pt remains intubated w/o sedation. Pt assessed, w/oral and tony care performed. Pt is unresponsive to stimuli w/fixed pupils. Per report, pt's family is to come in this morning to make pt cmo. Bp noted to have dropped, recycled and remains low. Called  for orders. Order for dopamine obtained and started. Pt's Family also called, states they are approx 5 min away. Dopamine titrated to maintain bp until family arrives. Sister and s/o at bedside, updated on addition of dopamine to maintain bp. Family aware, states there is more family on the way, not yet ready to make cmo. Dopamine increased until at max, family aware. Family instructed to let staff know when they are ready and we will proceed with comfort measures. Family at bedside, states they are ready to make pt cmo. Dr.Defiglio medina, cmo and extubation orders placed by md. Medications obtained and family steps out of room. Ativan given prior to extubation. All gtts stopped. Morphine given after extubation d/t pt gasping. Pt expires at 1035. Family, md,  and nursing supervisor aware. Multiple family members at bedside rotating visitation. Spoke w/Andra at [facility] concerning pt's death. Post mortem care performed.  home arrives to take pt, belongings released to  home.

## 2021-12-19 NOTE — PROGRESS NOTES
Bereavement Note:     responded to the death of Robbie Whipple, who is a 73 y.o., female, offering Spiritual Care to patient and family, see flow sheets for interventions. Date of Death: 12/19/2021    Extended Emergency Contact Information  Primary Emergency Contact: Arnulfo [address] Phone: [phone]  Mobile Phone: [phone]  Relation: Sister  Secondary Emergency Contact: Yolanda Hoang  Home Phone: [phone]  Mobile Phone: [phone]  Relation: Life Partner                 YES      NO  UNKNOWN  Life Net   []        [x]    []   Eye Bank   [] [x] []   Medical Examiner  []        [x]  []   Going to [employer]  []        [x] []      Autopsy   []        [x]         []   Sympathy Card  [x]        []  Bereavement Materials  [x]        []           Business Card Provided  [x]        []             Mobile Card [facility] [address] [facility] [address] ([place])    Chaplains will continue to follow family and will provide spiritual care as needed.    [address]   [phone] - Office

## 2021-12-19 NOTE — PROGRESS NOTES
Family at the bedside we called . I confirmed family wishes comfort care sister and partner at the bedside.   Gosia Jamil MD

## 2021-12-19 NOTE — PROGRESS NOTES
[facility] Specialists  Pulmonary, Critical Care, and Sleep Medicine    Name: Estephanie Baez MRN: [id number]   : 1950 Hospital: [facility] [place]    Date: 2021  Room: [address] [place] [address] Note                                              Consult requesting physician: Dr. Holland Torres  Reason for Consult: cardiopulmonary  Arrest, hypoxemic respiratory failure, unresponsiveness, metastatic breast cancer, renal failure, metabolic acidosis      IMPRESSIOn   · Cardio pulmonary arrest  · Respiratory failure  · Shock  · Metastatic breast cancer  · Hyperglycemia  · Metabolic acidosis  · Congestive heart failure  Patient Active Problem List   Diagnosis Code    Shock ([address] R57.9    Cardiac arrest ([address] I46.9    Respiratory failure ([address] J96.90    Breast cancer ([facility] C50.919    SAUNDRA (acute kidney injury) ([address] N17.9    LFT elevation R79.89    Acute hyperglycemia R73.9    Hypokalemia E87.6    PAF (paroxysmal atrial fibrillation) (HCC) I48.0    Asthma J45.909    Anoxic brain damage (HCC) G93.1         PMH I reviewed chart from  [facility]   Past Medical History:   Diagnosis Date    Aneurysm (CMS/HCC)    Breast cancer (CMS/HCC)    Cataract    Cholelithiasis 3/24/2009    Chronic kidney disease (CKD) stage G3a/A1, moderately decreased glomerular filtration rate (GFR) between 45-59 mL/min/1.73 square meter and albuminuria creatinine ratio less than 30 mg/g (CMS/HCC) 10/17/2021    Chronic systolic heart failure (CMS/HCC) 3/24/2009    Dilated cardiomyopathy (CMS/HCC) 5/15/2017    Gastroesophageal reflux disease with esophagitis 2012    Malignant neoplasm of breast (CMS/HCC) 3/24/2009    Malignant neoplasm of upper outer quadrant of breast in female, estrogen receptor negative (CMS/HCC) 2021    Malignant neoplasm of upper outer quadrant of breast in female, estrogen receptor negative (CMS/HCC) 2021    Neuropathy (CMS/HCC)    Paroxysmal atrial fibrillation (CMS/HCC) 1/26/2016    Paroxysmal ventricular tachycardia (CMS/HCC) 9/14/2009    Presence of cardiac pacemaker 9/14/2009   NO Device it was explanted however she does have leads that are capped off in place    Stroke (cerebrum) (CMS/HCC)   2018    Thrombocytopenia (CMS/HCC) 7/27/2017         Code status: full code   Recent visit with palliative care at [place] evaluated  Stopped all tx for cancer stage 4    RECOMMENDATIONS:    Respiratory:   Cardiopulmonary arrest initially in PEA no pulmonary embolus. Multifactorial has metastatic breast cancer cardiomyopathy. Continue full ventilatory support. RR above the vent   ABG 7.22/30/350/95  /27/100%/peep 5 wean to 80 %   contineu weaning down Fio2   No change in PEEP  bicabonate for acidosis   CT of the chest evaluated. No PE aspiration pneumonia possible infiltrate  abx  and bronchodilators ordered    Keep SPO2 >=92%. HOB 30 degree elevation all the time. Aggressive pulmonary toileting. Aspiration precautions. Incentive spirometry. CVS: cardiopulmonary arrest history of cardiomyopathy and pacemaker. worseing shock already on max levophed now add dopamine  Comfort care pending  12/16   Left Ventricle: Left ventricle is mildly dilated. Normal wall thickness. See diagram for wall motion findings. Severely reduced left ventricular systolic function with a visually estimated EF of 15 - 20%.   Right Ventricle: Not well visualized. Right ventricle is mildly dilated. Moderately reduced systolic function.   Left Atrium: Left atrium is mildly dilated.   Pulmonary Artery : Estimated Pulmonary Systolic Artery is 33 mmhg. Pulmonary hypertension found to be mild.   Patient is ventilated, cannot use IVC diameter to estimate right atrial pressure. IVC size is normal.       On Eliquis and Lasix and digoxin at home suspect has low ejection fraction? Echo pending. Status post cardiac arrest over 30 minutes.   Continue epi continue Levophed if worsen add vasopressin. Continue Albumin's. IV fluid prn      ID: WBC normal, no fever  resp culure normal respiratory macarena   Aspiration pneumonia  Stop vancomycin  Blood culure negative   Culture negative   Sepsis less likely shock most likely due to cardiopulmonary arrest from primary cardiac issues and complications of breast cancer  Panculture. Normal White blood cells patient is off all chemotherapy. Broad-spectrum antibiotics vancomycin and Zosyn stop tomoorw if no MRSA vanc    Sepsis bundle and protocol followed. Follow serial lactic acid, frequent BMP check, fluid resuscitation. Follow cultures. Deescalate antibiotic when appropriate. Hematology/Oncology: Stage IV breast cancer recently stopped chemotherapy due to ineffective treatment was talking to palliative care. We will consult oncology and palliative care. Monitor hemoglobin white blood cells and coagulation profile patient was on Eliquis at home no PE. Post prolonged cardiac arrest will be in high risk of bleeding into chest post prolonged CPR will start low-dose Lovenox and reevaluate    Renal:  No improvement anuria   Very poor urine output on bicarbonate drip  Worsening renal failure due to cardiac arrest not a good candidate for HD  Unresponsive suspect anoxia    Acute on chronic renal failure with severe hyperkalemia, hyperglycemia, electrolyte derangement, dehydration, metabolic acidosis. Replace potassium and start insulin drip after. Continue hydration. Electrolyte replacement protocol per ICU placed start. Monitor kidney function. GI/: Shock liver no acute abnormalities on CT of the abdomen and pelvis no perforation    Endocrine: Monitor BS. SSI. Acute hyperglycemia possible DKA anion gap mildly elevated. Follow.   Start insulin drip after replacement of potassium check magnesium phosphorus    Neurology: anoxic brain injury post prolonged CPR  Anoxic brain injury    Off sedation pupils minimally reactive, no corneal, no gag  RR above the evnt     CT of the head evaluated no acute CVA or bleeding. Unresponsive post prolonged cardiac arrest pupils nonreactive 3 mm  I suspect. No sedation given I suspect anoxic brain injury. If uncomfortable or unable to ventilate we will start as needed low-dose of Versed and fentanyl        Toxicology: None    Pain/Sedation: RN fentanyl for pain    Skin/Wound: none    Electrolytes: Replace electrolytes per ICU electrolyte replacement protocol. IVF: DKA protocol    Nutrition: P.o. Prophylaxis: DVT Prophylaxis: SCD/Lovenox if no bleeding will resume Eliquis GI Prophylaxis:     Restraints: none  Responsive after cardiac arrest    Lines/Tubes: PIV  ETT: In place  OGT/NGT: Place once stable  Patient has port we are using it for pressors    Infante: Place Infante (Medically necessary for strict input/output monitoring in critically ill patient, will remove it when not needed. Infante bundle followed). Other:    PT/OT eval and treat. OOB. Advance Directive/Palliative Care: DNR changed on 17/17 considering comfort care  I consulted palliative care and evaluated recent on December 10 palliative care consultation patient did not want any extreme measurement but she did not sign officially DNR/DNI or declared hospice. So was in the gray zone. Code intubated CPR prolonged. Prognosis extremely poor I had long discussion with sister she is going to discuss it with family. I do not recommend to repeat CPR but final decision from family pending    Will defer respective systems problem management to primary and other respective consultant and follow patient in ICU with primary and other medical team.  Further recommendations will be based on the patient's response to recommended treatment and results of the investigation ordered. Quality Care: PPI, DVT prophylaxis, HOB elevated, Infection control all reviewed and addressed.     Care of plan d/w RN, RT, MDR, hospitalist team.    High complexity decision making was performed during the evaluation of this patient at high risk for decompensation with multiple organ involvement. Total critical care time spent rendering care exclusive of procedures/family discussion/coordination of care: 48 minutes. Discussed with family, neurology, palliative care   Subjective/History of Present Illness:     Patient is a 73 y.o. female with PMHx significant for congestive heart failure, atrial fibrillation, pacemaker, breast  cancer stage IV recurrence of the disease Antley of treatment seeing palliative care CODE STATUS was not clarified in the chart, on anticoagulation with Eliquis. Patient was recently seen by palliative care all evaluated note she said she does not want any extreme measures, however she was not declared fully DNR/DNI on hospice. Based on the last note. Patient was unwell and she had advanced breast cancer with recurrence of the disease that started 27 years ago, recently stopped all the treatment as it was ineffective, patient had cardiac arrest at home. EMS on arrival started ACLS protocol for PEA arrest.  Epi was given. Also V. fib reported 3 times defibrillated at home. ROSC achieved en route. Cardiac arrest again in ambulance given bicarbonate amiodarone Narcan. Blood sugar was stable 135. Patient was intubated. On arrival to emergency room still chest compressions ongoing. No pulse. Another 25 minutes of CPR in the emergency room. Return of pulse. Patient is currently on 2 of epi, Levophed 8, intubated, blood pressure systolic 399M, unresponsive with no sedation. Pupils no response. No spontaneous movement. CT of the head evaluated CTA of the chest no PE CT of the abdomen no acute GI issues. Known metastatic breast cancer to lungs. I met with sister at the bedside who is healthcare proxy. Continue current care. I have consulted palliative care.   Family will discuss further steps    12/19/2021:  She remains in intensive care unit in bed 9.  Intubated not on sedation  juvenalgretchen moctezuma is now on max levophed add dopamine  I am speaking to family at the bedside  They are planning comfort care today  We called chaplan I will place palliatiev care orders   I/O last 24 hrs: Intake/Output Summary (Last 24 hours) at 12/19/2021 1005  Last data filed at 12/18/2021 1800  Gross per 24 hour   Intake 1945.2 ml   Output 1400 ml   Net 545.2 ml         The patient is critically ill and can not provide additional history due to Unconsciousness    History taken from sister and EMR Ed     Review of Systems:    ROS not obtained due to patient factor. Allergies   Allergen Reactions    Latex Itching    Codeine Itching    Lisinopril Cough    Venlafaxine Other (comments)     confusion      Past Medical History:   Diagnosis Date    Asthma     HTN (hypertension)     Metastatic breast cancer (HCC)     PAF (paroxysmal atrial fibrillation) (HCC)       No past surgical history on file. Social History     Tobacco Use    Smoking status: Not on file    Smokeless tobacco: Not on file   Substance Use Topics    Alcohol use: Not on file      No family history on file.    Prior to Admission medications    Not on File     Current Facility-Administered Medications   Medication Dose Route Frequency    DOPamine in 5 % dextrose (INTROPIN) 800 mg/500 mL (1,600 mcg/mL) infusion        DOPamine (INTROPIN) 800 mg in dextrose 5% 500 mL infusion  0-20 mcg/kg/min IntraVENous TITRATE    white petrolatum-mineral oiL (LACRILUBE S.O.P.) ointment   Both Eyes Q12H    piperacillin-tazobactam (ZOSYN) 3.375 g in 0.9% sodium chloride (MBP/ADV) 100 mL MBP  3.375 g IntraVENous Q12H    enoxaparin (LOVENOX) injection 30 mg  30 mg SubCUTAneous Q24H    NOREPINephrine (LEVOPHED) 8 mg in 5% dextrose 250mL (32 mcg/mL) infusion  0.5-30 mcg/min IntraVENous TITRATE    dextrose 10% infusion  50 mL/hr IntraVENous CONTINUOUS    0.9% sodium chloride infusion  25 mL/hr IntraVENous CONTINUOUS    pantoprazole (PROTONIX) 40 mg in 0.9% sodium chloride 10 mL injection  40 mg IntraVENous DAILY    sodium bicarbonate (8.4%) 150 mEq in dextrose 5% 1,000 mL infusion   IntraVENous CONTINUOUS    Vancomycin - Rx to dose and monitor  1 Each Other Rx Dosing/Monitoring    albumin human 25% (BUMINATE) solution 25 g  25 g IntraVENous Q6H         Objective:   Vital Signs:    Visit Vitals  BP 96/71   Pulse 92   Temp 98.3 °F (36.8 °C)   Resp 27   Ht 5' 2\" (1.575 m)   Wt 85.9 kg (189 lb 6 oz)   SpO2 100%   BMI 34.64 kg/m²       O2 Device: Endotracheal tube,Ventilator       Temp (24hrs), Av.3 °F (37.4 °C), Min:97.5 °F (36.4 °C), Max:102.6 °F (39.2 °C)       Intake/Output:   Last shift:      No intake/output data recorded. Last 3 shifts:  1901 -  0700  In: 3727.9 [I.V.:3727.9]  Out: 1400 [Drains:1400]      Intake/Output Summary (Last 24 hours) at 2021 1005  Last data filed at 2021 1800  Gross per 24 hour   Intake 1945.2 ml   Output 1400 ml   Net 545.2 ml       Last 3 Recorded Weights in this Encounter    21 1058 21 1521 21 0849   Weight: 77.1 kg (170 lb) 77.1 kg (170 lb) 85.9 kg (189 lb 6 oz)           Mode Rate Tidal Volume Pressure FiO2 PEEP   Assist control,VC+   400 ml    100 % 5 cm H20     Peak airway pressure: 22 cm H2O    Plateau pressure:     Tidal volume:    Minute ventilation: 6.86 l/min     Recent Labs     21  0450 21  0447 21  1627 21  1123 21  1123   PHI 7.22* 7.40  --   --  7.26*   PCO2I 30.8* 29.0*  --   --  44.9   PO2I 350* 144*  --   --  378*   HCO3I 12.6* 17.9* 26.3*   < > 19.9*   FIO2  --   --   --   --  100   FIO2I 100 80  --   --   --     < > = values in this interval not displayed.        Physical Exam:     General/Neurology: intubated  nonsedated unresponsive pupil nonreactive, respiratory rate above the vent, withdrawal to painful stimuli, chronically  ill-appearing, not on any sedation over 24 hsr  Neuro: no resposne to painful stimuli, pupils 2 mm non rective, no corneal refex, no gaga, RR above the vent  Head:   Normocephalic, without obvious abnormality, atraumatic. Eye:   swollen No scleral icterus, no pallor, no cyanosis. Nose:   No sinus tenderness  Throat:  Lips, mucosa, and tongue normal. No oral thrush. Neck:   Supple, symmetric. No lymphadenopathy. Trachea midline  Lung: Moderate air entry bilateral equal bila  No rales at the bases  No rhonchi. No wheezing. No stridors. No prolongded expiration. No accessory muscle use. Heart:   Regular rate & rhythm. S1 S2 present. No murmur. No JVD. Abdomen:  Soft. NT. ND. +BS. No masses. Extremities:  1 plus pedal edema. No cyanosis. No clubbing. Pulses: 2+ and symmetric in DP. Capillary refill: normal  Lymphatic:  No cervical or supraclavicular palpable lymphadenopathy. Musculoskeletal: No joint swelling. No tenderness. Skin:   Color, texture, turgor normal. No rashes or lesions.        Data:       Recent Results (from the past 24 hour(s))   GLUCOSE, POC    Collection Time: 12/18/21 11:36 AM   Result Value Ref Range    Glucose (POC) 120 (H) 70 - 110 mg/dL   VANCOMYCIN, RANDOM    Collection Time: 12/18/21  1:00 PM   Result Value Ref Range    Vancomycin, random 25.3 5.0 - 40.0 UG/ML   GLUCOSE, POC    Collection Time: 12/18/21  5:19 PM   Result Value Ref Range    Glucose (POC) 95 70 - 110 mg/dL   GLUCOSE, POC    Collection Time: 12/18/21 11:22 PM   Result Value Ref Range    Glucose (POC) 61 (L) 70 - 110 mg/dL   GLUCOSE, POC    Collection Time: 12/19/21  5:02 AM   Result Value Ref Range    Glucose (POC) 29 (LL) 70 - 110 mg/dL   GLUCOSE, POC    Collection Time: 12/19/21  5:05 AM   Result Value Ref Range    Glucose (POC) 30 (LL) 70 - 110 mg/dL         Chemistry Recent Labs     12/18/21  0525 12/17/21  1117 12/17/21  0742 12/17/21  0150 12/17/21  0150 12/16/21  2242 12/16/21  1631 12/16/21  1630 12/16/21  1250   GLU 56*  --  98  --  82  --   --    < > 533*   NA 143  --  144  --  146*  --   --    < > 134*   K 4.6 5.0 3.1*   < > 2.7*  --   --    < > 2.5*   CL 97*  --  101  --  103  --   --    < > 91*   CO2 14*  --  19*  --  18*  --   --    < > 25   BUN 40*  --  28*  --  26*  --   --    < > 13   CREA 5.59*  --  3.21*  --  2.94*  --   --    < > 1.55*   CA 7.5*  --  8.9  --  8.6  --   --    < > 9.0   MG  --   --   --   --  1.8 2.2  --   --  2.1   PHOS 7.6*  --  2.0*  --   --   --  3.6   < >  --    AGAP 32*  --  24*  --  25*  --   --    < > 18   BUCR 7*  --  9*  --  9*  --   --    < > 8*   AP  --   --   --   --   --   --   --   --  135*   TP  --   --   --   --   --   --   --   --  5.7*   ALB 4.1  --  3.6  --   --   --  2.8*   < > 2.3*   GLOB  --   --   --   --   --   --   --   --  3.4   AGRAT  --   --   --   --   --   --   --   --  0.7*    < > = values in this interval not displayed. Lactic Acid No results found for: LAC  No results for input(s): LAC in the last 72 hours. Liver Enzymes Protein, total   Date Value Ref Range Status   12/16/2021 5.7 (L) 6.4 - 8.2 g/dL Final     Albumin   Date Value Ref Range Status   12/18/2021 4.1 3.4 - 5.0 g/dL Final     Globulin   Date Value Ref Range Status   12/16/2021 3.4 2.0 - 4.0 g/dL Final     A-G Ratio   Date Value Ref Range Status   12/16/2021 0.7 (L) 0.8 - 1.7   Final     Alk. phosphatase   Date Value Ref Range Status   12/16/2021 135 (H) 45 - 117 U/L Final     Recent Labs     12/18/21  0525 12/17/21  0742 12/16/21  1631 12/16/21  1630 12/16/21  1250   TP  --   --   --   --  5.7*   ALB 4.1 3.6 2.8*   < > 2.3*   GLOB  --   --   --   --  3.4   AGRAT  --   --   --   --  0.7*   AP  --   --   --   --  135*    < > = values in this interval not displayed.         CBC w/Diff Recent Labs     12/18/21  0525 12/17/21  0415 12/16/21  1630   WBC 12.4 6.0 7.7   RBC 2.66* 3.22* 4.52   HGB 8.2* 9.9* 13.4   HCT 28.0* 32.9* 46.0*   * 164 240   GRANS 52 39* 59   LYMPH 6* 14* 20*   EOS 0 0 0        Cardiac Enzymes No results found for: CPK, CK, CKMMB, CKMB, RCK3, CKMBT, CKNDX, CKND1, ENOC, TROPT, TROIQ, JANUARY, TROPT, TNIPOC, BNP, BNPP     BNP No results found for: BNP, BNPP, XBNPT     Coagulation Recent Labs     12/16/21  1630   PTP 21.4*   INR 2.0*         Thyroid  Lab Results   Component Value Date/Time    TSH 1.00 10/30/2009 02:17 AM       No results found for: T4     Urinalysis Lab Results   Component Value Date/Time    Color DARK YELLOW 12/16/2021 12:50 PM    Appearance CLEAR 12/16/2021 12:50 PM    Specific gravity 1.017 12/16/2021 12:50 PM    pH (UA) 5.5 12/16/2021 12:50 PM    Protein TRACE (A) 12/16/2021 12:50 PM    Glucose Negative 12/16/2021 12:50 PM    Ketone Negative 12/16/2021 12:50 PM    Bilirubin Negative 12/16/2021 12:50 PM    Urobilinogen 1.0 12/16/2021 12:50 PM    Nitrites Negative 12/16/2021 12:50 PM    Leukocyte Esterase Negative 12/16/2021 12:50 PM    Epithelial cells 1+ 12/16/2021 12:50 PM    Bacteria FEW (A) 12/16/2021 12:50 PM    WBC 0 to 3 12/16/2021 12:50 PM    RBC 0 to 3 12/16/2021 12:50 PM        Micro  Recent Labs     12/16/21 2040 12/16/21  1630   CULT FEW NORMAL RESPIRATORY NIK  FEW YEAST, (APPARENT CANDIDA ALBICANS)* NO GROWTH 3 DAYS     Recent Labs     12/16/21 2040 12/16/21  1630   CULT FEW NORMAL RESPIRATORY NIK  FEW YEAST, (APPARENT CANDIDA ALBICANS)* NO GROWTH 3 DAYS          Culture data during this hospitalization.    All Micro Results     Procedure Component Value Units Date/Time    CULTURE, RESPIRATORY/SPUTUM/BRONCH Ardean Sayer STAIN [[id number]]  (Abnormal) Collected: 12/16/21 2040    Order Status: Completed Specimen: Sputum from Transtracheal Aspirate Updated: 12/19/21 0846     Special Requests: NO SPECIAL REQUESTS        GRAM STAIN 1+ WBCS SEEN         NO EPITHELIAL CELLS SEEN               OCCASIONAL GRAM POSITIVE COCCI IN PAIRS                  OCCASIONAL GRAM POSITIVE RODS                  OCCASIONAL APPARENT GRAM NEGATIVE RODS           Culture result:       FEW NORMAL RESPIRATORY NIK FEW YEAST, (APPARENT CANDIDA ALBICANS)          CULTURE, BLOOD [[id number]] Collected: 12/16/21 1630    Order Status: Completed Specimen: Blood Updated: 12/19/21 0718     Special Requests: NO SPECIAL REQUESTS        Culture result: NO GROWTH 3 DAYS       COVID-19 RAPID TEST [[id number]] Collected: 12/16/21 1704    Order Status: Completed Specimen: Nasopharyngeal Updated: 12/16/21 1750     Specimen source Nasopharyngeal        COVID-19 rapid test Not detected        Comment: Rapid Abbott ID Now       Rapid NAAT:  The specimen is NEGATIVE for SARS-CoV-2, the novel coronavirus associated with COVID-19. Negative results should be treated as presumptive and, if inconsistent with clinical signs and symptoms or necessary for patient management, should be tested with an alternative molecular assay. Negative results do not preclude SARS-CoV-2 infection and should not be used as the sole basis for patient management decisions. This test has been authorized by the FDA under an Emergency Use Authorization (EUA) for use by authorized laboratories. Fact sheet for Healthcare Providers: ConventionUpdate.co.nz  Fact sheet for Patients: ConventionUpdate.co.nz       Methodology: Isothermal Nucleic Acid Amplification         CULTURE, BLOOD [[id number]] Collected: 12/16/21 1704    Order Status: Canceled Specimen: Blood     CULTURE, BLOOD [[id number]] Collected: 12/16/21 1445    Order Status: Canceled Specimen: Blood              CT HEAD WO CONT    Result Date: 12/16/2021  EXAM: CT head INDICATION: Cardiac arrest, metastatic breast cancer COMPARISON: None. TECHNIQUE: Axial CT imaging of the head was performed without intravenous contrast. Standard multiplanar coronal and sagittal reformatted images were obtained and are included in interpretation.  One or more dose reduction techniques were used on this CT: automated exposure control, adjustment of the mAs and/or kVp according to patient size, and iterative reconstruction techniques. The specific techniques used on this CT exam have been documented in the patient's electronic medical record. Digital Imaging and Communications in Medicine (DICOM) format image data are available to nonaffiliated external healthcare facilities or entities on a secure, media free, reciprocally searchable basis with patient authorization for at least a 12-month period after this study. _______________ FINDINGS: BRAIN AND POSTERIOR FOSSA: Mild cortical and cerebellar volume loss is present. The ventricular size and configuration is within normal limits. Basilar cisterns are patent. Mild periventricular white matter low-attenuation is present. There is no intracranial hemorrhage, mass effect, or midline shift. The gray-white matter differentiation is within normal limits. EXTRA-AXIAL SPACES AND MENINGES: There are no abnormal extra-axial fluid collections. CALVARIUM: Intact. SINUSES: Layering fluid present within the sphenoid sinus with mild mucosal thickening of the anterior and posterior ethmoid air cells. OTHER: Ocular lenses are surgically replaced _______________     1. No acute intracranial abnormality demonstrated. 2. Subcortical and periventricular white matter low-attenuation as can be seen with sequela of chronic ischemic microvascular change 3. Nonspecific paranasal mucosal thickening/fluid    CTA CHEST W OR W WO CONT    Result Date: 12/16/2021  EXAM: CTA Chest INDICATION: Cardiac arrest, patient with history of breast cancer COMPARISON: No prior study. TECHNIQUE: Axial CT imaging from the thoracic inlet through the diaphragm with intravenous contrast utilizing CTA study for pulmonary artery evaluation. Coronal and sagittal MIP reformations were generated at a separate workstation.  One or more dose reduction techniques were used on this CT: automated exposure control, adjustment of the mAs and/or kVp according to patient size, and iterative reconstruction techniques. The specific techniques used on this CT exam have been documented in the patient's electronic medical record. Digital Imaging and Communications in Medicine (DICOM) format image data are available to nonaffiliated external healthcare facilities or entities on a secure, media free, reciprocally searchable basis with patient authorization for at least a 12-month period after this study. _______________ FINDINGS: EXAM QUALITY: Overall exam quality is adequate. Pulmonary arterial enhancement is adequate. Although the exam is degraded by motion artifact, distal filter be diagnostic for the exclusion of pulmonary embolism. PULMONARY ARTERIES: No convincing evidence of pulmonary embolism. MEDIASTINUM: Cardiac size is mildly enlarged. Thoracic aorta is essentially unenhanced appearance but normal in course and caliber. No evidence of pericardial effusion. Right chest wall Mediport catheter with tip present in the SVC. LYMPH NODES: No enlarged mediastinal or hilar nodes by size criteria. AIRWAY: Endotracheal tube appropriately positioned. LUNGS: Multifocal areas of consolidation are demonstrated, greatest in conspicuity within the right upper lobe, both superiorly and posteriorly, throughout the right middle lobe, and medial portions of the lower lobes bilaterally, right greater than left. PLEURA: No pneumothorax. Small/trace bilateral pleural effusions. UPPER ABDOMEN: Partial inclusion nasogastric tube within the stomach. Reflux of contrast into the infrahepatic IVC and hepatic veins. . OTHER: There are multiple rib fractures present:   > On the right, there are moderately displaced anterolateral fractures of the third, fourth, fifth, sixth, seventh, eighth, and ninth ribs.   > On the left there are mildly displaced fractures of the left third, fourth, fifth ribs. Vertebral body heights are normal. No compression fracture. Partial inclusion right breast prosthesis. _______________     1.  No evidence of pulmonary embolism. 2. Multifocal areas of alveolar consolidation, greatest across the right upper, middle, and lower lobes as described above, potentially pneumonitis related to aspiration given the distribution. 3. Small bilateral pleural effusions without pneumothorax. 4. Multiple bilateral rib fractures as described in detail above. CT ABD PELV W CONT    Result Date: 12/16/2021  EXAM: CT of the abdomen and pelvis INDICATION: Abdominal pain, cardiac arrest, breast cancer, respiratory failure. COMPARISON: None. TECHNIQUE: Axial CT imaging of the abdomen and pelvis was performed with intravenous contrast. Multiplanar reformats were generated. One or more dose reduction techniques were used on this CT: automated exposure control, adjustment of the mAs and/or kVp according to patient size, and iterative reconstruction techniques. The specific techniques used on this CT exam have been documented in the patient's electronic medical record. Digital Imaging and Communications in Medicine (DICOM) format image data are available to nonaffiliated external healthcare facilities or entities on a secure, media free, reciprocally searchable basis with patient authorization for at least a 12-month period after this study. _______________ FINDINGS: LOWER CHEST: Please see contemporaneously performed CT angiogram of the chest. LIVER, BILIARY: Significantly heterogeneous enhancement pattern to the liver noted with dilated infrahepatic IVC. No biliary dilation. Cholecystectomy. PANCREAS: Normal. SPLEEN: Normal. ADRENALS: Normal. KIDNEYS/URETERS/BLADDER: No hydronephrosis. Symmetric renal enhancement. Urinary bladder decompressed with Infante catheter in situ. PELVIC ORGANS: Hysterectomy. VASCULATURE: Mild diffuse aortobiiliac atherosclerosis without evidence of aneurysmal dilatation. LYMPH NODES: No enlarged lymph nodes. GASTROINTESTINAL TRACT: Nasogastric tube is appropriately positioned within the stomach.  Evaluation of small and large bowel is somewhat limited secondary to respiratory motion artifact. No morphology of bowel obstruction. No free intraperitoneal gas. Normal appendix. BONES: No acute or aggressive osseous abnormalities identified. Lower lumbar spondylosis and facet joint osteoarthritis. OTHER: None. _______________     1. Heterogeneous parenchymal enhancement which may be accentuated by contrast bolus timing. These findings may also be accentuated by low cardiac output state/congestive hepatopathy. 2. Nasogastric tube appropriately positioned within the stomach. No evidence of bowel obstruction or free intraperitoneal gas. 3. No hydronephrosis. Urinary bladder decompressed with Infante catheter in situ. XR CHEST PORT    Result Date: 12/16/2021  EXAM: XR CHEST PORT CLINICAL INDICATION/HISTORY: meets SIRS criteria -Additional: Cardiac arrest, metastatic breast cancer COMPARISON: Radiographs 10/29/2009 TECHNIQUE: Portable frontal view of the chest _______________ FINDINGS: SUPPORT DEVICES: Endotracheal tube is approximately 2.3 cm above the jean. Nasogastric tube is below the diaphragm, tip collimated from view. Right chest wall Mediport catheter tip projecting at the superior cavoatrial junction. HEART AND MEDIASTINUM: Normal appearing cardiac size and mediastinal contours. LUNGS AND PLEURAL SPACES: There are areas of diffuse bilateral pulmonary opacification, right greater than left. No evidence of pneumothorax or definite pleural effusion. BONY THORAX AND SOFT TISSUES: Bilateral axillary surgical clips. No acute osseous abnormality demonstrated. _______________     1. Support devices in appropriate position. 2. Asymmetric, diffuse areas of pulmonary opacification, potentially edema and-or pneumonia.          PFT       Ultrasound       LE Doppler       ECHO       Images report reviewed by me:  CT (Most Recent) (CT chest reviewed by me) Results from Hospital Encounter encounter on 12/16/21    CTA CHEST W OR W WO CONT    Narrative  EXAM: CTA Chest    INDICATION: Cardiac arrest, patient with history of breast cancer    COMPARISON: No prior study. TECHNIQUE: Axial CT imaging from the thoracic inlet through the diaphragm with  intravenous contrast utilizing CTA study for pulmonary artery evaluation. Coronal and sagittal MIP reformations were generated at a separate workstation. One or more dose reduction techniques were used on this CT: automated exposure  control, adjustment of the mAs and/or kVp according to patient size, and  iterative reconstruction techniques. The specific techniques used on this CT  exam have been documented in the patient's electronic medical record. Digital  Imaging and Communications in Medicine (DICOM) format image data are available  to nonaffiliated external healthcare facilities or entities on a secure, media  free, reciprocally searchable basis with patient authorization for at least a  12-month period after this study. _______________    FINDINGS:    EXAM QUALITY: Overall exam quality is adequate. Pulmonary arterial enhancement  is adequate. Although the exam is degraded by motion artifact, distal filter be  diagnostic for the exclusion of pulmonary embolism. PULMONARY ARTERIES: No convincing evidence of pulmonary embolism. MEDIASTINUM: Cardiac size is mildly enlarged. Thoracic aorta is essentially  unenhanced appearance but normal in course and caliber. No evidence of  pericardial effusion. Right chest wall Mediport catheter with tip present in the  SVC. LYMPH NODES: No enlarged mediastinal or hilar nodes by size criteria. AIRWAY: Endotracheal tube appropriately positioned. LUNGS: Multifocal areas of consolidation are demonstrated, greatest in  conspicuity within the right upper lobe, both superiorly and posteriorly,  throughout the right middle lobe, and medial portions of the lower lobes  bilaterally, right greater than left. PLEURA: No pneumothorax.  Small/trace bilateral pleural effusions. UPPER ABDOMEN: Partial inclusion nasogastric tube within the stomach. Reflux of  contrast into the infrahepatic IVC and hepatic veins. .    OTHER:  There are multiple rib fractures present:  > On the right, there are moderately displaced anterolateral fractures of the  third, fourth, fifth, sixth, seventh, eighth, and ninth ribs.  > On the left there are mildly displaced fractures of the left third, fourth,  fifth ribs. Vertebral body heights are normal. No compression fracture. Partial inclusion right breast prosthesis. _______________    Impression  1. No evidence of pulmonary embolism. 2. Multifocal areas of alveolar consolidation, greatest across the right upper,  middle, and lower lobes as described above, potentially pneumonitis related to  aspiration given the distribution. 3. Small bilateral pleural effusions without pneumothorax. 4. Multiple bilateral rib fractures as described in detail above. CXR reviewed by me:  XR (Most Recent). CXR  reviewed by me and compared with previous CXR Results from Hospital Encounter encounter on 12/16/21    XR CHEST PORT    Narrative  EXAM: CHEST RADIOGRAPH, SINGLE VIEW    CLINICAL INDICATION/HISTORY: et tube  <Additional:  None. COMPARISON: 12/17/2021    TECHNIQUE: Portable frontal view of the chest was obtained.    _______________    FINDINGS:    SUPPORT DEVICES: ETT is 2.7 cm above the jean, right jugular approach Mediport  catheter tip projects at the upper right atrium, nasogastric tube tip projects  at the expected position of the gastric body. HEART AND MEDIASTINUM: Cardiac silhouette is mildly prominent. LUNGS AND PLEURAL SPACES: Interstitial and alveolar markings are present  throughout the right lung, and in the left perihilar and basilar lung, slightly  improved on the right. . No pneumothorax or pleural effusion.     BONY THORAX AND SOFT TISSUES: Surgical clips are present in both axillary  regions. _______________    Impression  Asymmetrical right greater than left pulmonary edema versus pneumonia, slightly  improved on the right, otherwise unchanged. ·Please note: Voice-recognition software may have been used to generate this report, which may have resulted in some phonetic-based errors in grammar and contents. Even though attempts were made to correct all the mistakes, some may have been missed, and remained in the body of the document.       Bowen Walker MD  12/19/2021

## 2021-12-19 NOTE — PROGRESS NOTES
Hospitalist Progress Note    Patient: Luis Daniel Avitia MRN: [id number]  [facility]: [id number]    YOB: 1950  Age: 73 y.o. Sex: female    DOA: 12/16/2021 LOS:  LOS: 3 days                Assessment and Plan:    Principal Problem:    Cardiac arrest ([address] (12/16/2021)    Active Problems:    Shock ([address] (12/16/2021)      Respiratory failure ([address] (12/16/2021)      Breast cancer ([address] (12/16/2021)      SAUNDRA (acute kidney injury) ([address] (12/16/2021)      LFT elevation (12/16/2021)      Acute hyperglycemia (12/16/2021)      Hypokalemia (12/16/2021)      Anoxic brain damage ([address] (12/17/2021)        Resp: on vent and managed by pulmonary. Compassionate extubation today, discussed with sister  at bedside who is POA and she understands     Aspiration pneumonia: on antibiotics     Cardiomyopathy: this is poor prognosis. Chief complaint:  73 y.o. female with metastatic breast cancer stage IV, hypertension, asthma, PAF on Eliquis at home presents to ER status post cardiac arrest.      Subjective: Intubated and unresponsive      Review of systems:    General: No fevers or chills. Cardiovascular: No chest pain or pressure. No palpitations. Pulmonary: No shortness of breath. Gastrointestinal: No nausea, vomiting. Objective:    Vital signs/Intake and Output:  Visit Vitals  BP 96/71   Pulse 92   Temp 98.3 °F (36.8 °C)   Resp 27   Ht 5' 2\" (1.575 m)   Wt 85.9 kg (189 lb 6 oz)   SpO2 100%   BMI 34.64 kg/m²     Current Shift:  No intake/output data recorded. Last three shifts:  12/17 1901 - 12/19 0700  In: 3727.9 [I.V.:3727.9]  Out: 1400 [Drains:1400]    Physical Exam:  General: NAD, AAOx3. Non-toxic. HEENT: NC/AT. PERRLA, EOMI.  MMM. Lungs: Nml inspection. CTA B/L. No wheezing, rales or rhonchi. Heart:  S1S2 RRR,  PMI mid 5th IC space. No M/RG. Abdomen: Soft, NT/ND.  BS+. No peritoneal signs. Extremities: No C/C/E. Psych:   Nml affect. Neurologic:  2-12 intact. Strength 5/5 throughout.   Sensation sujey.          Labs: Results:       Chemistry Recent Labs     12/18/21  0525 12/17/21  1117 12/17/21  0742 12/17/21  0150 12/17/21  0150 12/16/21  1631 12/16/21  1630 12/16/21  1250   GLU 56*  --  98  --  82  --    < > 533*     --  144  --  146*  --    < > 134*   K 4.6 5.0 3.1*   < > 2.7*  --    < > 2.5*   CL 97*  --  101  --  103  --    < > 91*   CO2 14*  --  19*  --  18*  --    < > 25   BUN 40*  --  28*  --  26*  --    < > 13   CREA 5.59*  --  3.21*  --  2.94*  --    < > 1.55*   CA 7.5*  --  8.9  --  8.6  --    < > 9.0   AGAP 32*  --  24*  --  25*  --    < > 18   BUCR 7*  --  9*  --  9*  --    < > 8*   AP  --   --   --   --   --   --   --  135*   TP  --   --   --   --   --   --   --  5.7*   ALB 4.1  --  3.6  --   --  2.8*   < > 2.3*   GLOB  --   --   --   --   --   --   --  3.4   AGRAT  --   --   --   --   --   --   --  0.7*    < > = values in this interval not displayed. CBC w/Diff Recent Labs     12/18/21  0525 12/17/21  0415 12/16/21  1630   WBC 12.4 6.0 7.7   RBC 2.66* 3.22* 4.52   HGB 8.2* 9.9* 13.4   HCT 28.0* 32.9* 46.0*   * 164 240   GRANS 52 39* 59   LYMPH 6* 14* 20*   EOS 0 0 0      Cardiac Enzymes Recent Labs     12/17/21  1117 12/17/21  0520   CPK Original order canceled by laboratory, test reordered by laboratory with the purpose of combining it with other laboratory orders. 728* 975*   CKND1 Original order canceled by laboratory, test reordered by laboratory with the purpose of combining it with other laboratory orders. 0.9 3.2      Coagulation Recent Labs     12/16/21  1630   PTP 21.4*   INR 2.0*       Lipid Panel No results found for: CHOL, CHOLPOCT, CHOLX, CHLST, CHOLV, [id number], HDL, HDLP, LDL, LDLC, DLDLP, [id number], VLDLC, VLDL, TGLX, TRIGL, TRIGP, TGLPOCT, CHHD, CHHDX   BNP No results for input(s): BNPP in the last 72 hours.    Liver Enzymes Recent Labs     12/18/21  0525 12/16/21  1630 12/16/21  1250   TP  --   --  5.7*   ALB 4.1   < > 2.3*   AP  --   --  135*    < > = values in this interval not displayed.       Thyroid Studies Lab Results   Component Value Date/Time    TSH 1.00 10/30/2009 02:17 AM        Procedures/imaging: see electronic medical records for all procedures/Xrays and details which were not copied into this note but were reviewed prior to creation of Plan

## 2021-12-22 LAB
BACTERIA SPEC CULT: NORMAL
SERVICE CMNT-IMP: NORMAL

## 2021-12-22 NOTE — DISCHARGE SUMMARY
Discharge Summary    Patient: Dylan Lopes MRN: [id number]  CSN: [id number]    YOB: 1950  Age: 73 y.o. Sex: female    DOA: 2021 LOS:  LOS: 3 days   Discharge Date: 2021     Primary Care Provider:  Enrrique Hernandez MD    Admission Diagnoses: Cardiac arrest [facility]) [I46.9]    Discharge Diagnoses:    Problem List as of 2021 Never Reviewed          Codes Class Noted - Resolved    Anoxic brain damage ([facility] ICD-10-CM: G93.1  ICD-9-CM: 348.1  2021 - Present        Shock ([facility] ICD-10-CM: R57.9  ICD-9-CM: 785.50  2021 - Present        * (Principal) Cardiac arrest ([facility] ICD-10-CM: I46.9  ICD-9-CM: 427.5  2021 - Present        Respiratory failure ([facility] ICD-10-CM: J96.90  ICD-9-CM: 518.81  2021 - Present        Breast cancer ([facility] ICD-10-CM: C50.919  ICD-9-CM: 174.9  2021 - Present        SAUNDRA (acute kidney injury) ([facility] ICD-10-CM: N17.9  ICD-9-CM: 584.9  2021 - Present        LFT elevation ICD-10-CM: R79.89  ICD-9-CM: 790.6  2021 - Present        Acute hyperglycemia ICD-10-CM: R73.9  ICD-9-CM: 790.29  2021 - Present        Hypokalemia ICD-10-CM: E87.6  ICD-9-CM: 276.8  2021 - Present        PAF (paroxysmal atrial fibrillation) ([facility] ICD-10-CM: I48.0  ICD-9-CM: 427.31  Unknown - Present        Asthma ICD-10-CM: J45.909  ICD-9-CM: 493.90  Unknown - Present            Patient was terminally extubated and became asystolic shortly thereafter. Family was notified. Discharge Medications: There are no discharge medications for this patient. Discharge Condition:         Minutes spent on discharge:       Labs: Results:       Chemistry No results for input(s): GLU, NA, K, CL, CO2, BUN, CREA, CA, AGAP, BUCR, TBIL, AP, TP, ALB, GLOB, AGRAT in the last 72 hours. No lab exists for component: GPT   CBC w/Diff No results for input(s): WBC, RBC, HGB, HCT, PLT, GRANS, LYMPH, EOS, HGBEXT, HCTEXT, PLTEXT in the last 72 hours. Cardiac Enzymes No results for input(s): CPK, CKND1, ENOC in the last 72 hours. No lab exists for component: CKRMB, TROIP   Coagulation No results for input(s): PTP, INR, APTT, INREXT in the last 72 hours. Lipid Panel No results found for: CHOL, CHOLPOCT, CHOLX, CHLST, CHOLV, 905395, HDL, HDLP, LDL, LDLC, DLDLP, 233871, VLDLC, VLDL, TGLX, TRIGL, TRIGP, TGLPOCT, CHHD, CHHDX   BNP No results for input(s): BNPP in the last 72 hours. Liver Enzymes No results for input(s): TP, ALB, TBIL, AP in the last 72 hours. No lab exists for component: SGOT, GPT, DBIL   Thyroid Studies Lab Results   Component Value Date/Time    TSH 1.00 10/30/2009 02:17 AM            Significant Diagnostic Studies: CT HEAD WO CONT    Result Date: 12/16/2021  EXAM: CT head INDICATION: Cardiac arrest, metastatic breast cancer COMPARISON: None. TECHNIQUE: Axial CT imaging of the head was performed without intravenous contrast. Standard multiplanar coronal and sagittal reformatted images were obtained and are included in interpretation. One or more dose reduction techniques were used on this CT: automated exposure control, adjustment of the mAs and/or kVp according to patient size, and iterative reconstruction techniques. The specific techniques used on this CT exam have been documented in the patient's electronic medical record. Digital Imaging and Communications in Medicine (DICOM) format image data are available to nonaffiliated external healthcare facilities or entities on a secure, media free, reciprocally searchable basis with patient authorization for at least a 12-month period after this study. _______________ FINDINGS: BRAIN AND POSTERIOR FOSSA: Mild cortical and cerebellar volume loss is present. The ventricular size and configuration is within normal limits. Basilar cisterns are patent. Mild periventricular white matter low-attenuation is present. There is no intracranial hemorrhage, mass effect, or midline shift.  The gray-white matter differentiation is within normal limits. EXTRA-AXIAL SPACES AND MENINGES: There are no abnormal extra-axial fluid collections. CALVARIUM: Intact. SINUSES: Layering fluid present within the sphenoid sinus with mild mucosal thickening of the anterior and posterior ethmoid air cells. OTHER: Ocular lenses are surgically replaced _______________     1. No acute intracranial abnormality demonstrated. 2. Subcortical and periventricular white matter low-attenuation as can be seen with sequela of chronic ischemic microvascular change 3. Nonspecific paranasal mucosal thickening/fluid    CTA CHEST W OR W WO CONT    Result Date: 12/16/2021  EXAM: CTA Chest INDICATION: Cardiac arrest, patient with history of breast cancer COMPARISON: No prior study. TECHNIQUE: Axial CT imaging from the thoracic inlet through the diaphragm with intravenous contrast utilizing CTA study for pulmonary artery evaluation. Coronal and sagittal MIP reformations were generated at a separate workstation. One or more dose reduction techniques were used on this CT: automated exposure control, adjustment of the mAs and/or kVp according to patient size, and iterative reconstruction techniques. The specific techniques used on this CT exam have been documented in the patient's electronic medical record. Digital Imaging and Communications in Medicine (DICOM) format image data are available to nonaffiliated external healthcare facilities or entities on a secure, media free, reciprocally searchable basis with patient authorization for at least a 12-month period after this study. _______________ FINDINGS: EXAM QUALITY: Overall exam quality is adequate. Pulmonary arterial enhancement is adequate. Although the exam is degraded by motion artifact, distal filter be diagnostic for the exclusion of pulmonary embolism. PULMONARY ARTERIES: No convincing evidence of pulmonary embolism. MEDIASTINUM: Cardiac size is mildly enlarged.  Thoracic aorta is essentially unenhanced appearance but normal in course and caliber. No evidence of pericardial effusion. Right chest wall Mediport catheter with tip present in the SVC. LYMPH NODES: No enlarged mediastinal or hilar nodes by size criteria. AIRWAY: Endotracheal tube appropriately positioned. LUNGS: Multifocal areas of consolidation are demonstrated, greatest in conspicuity within the right upper lobe, both superiorly and posteriorly, throughout the right middle lobe, and medial portions of the lower lobes bilaterally, right greater than left. PLEURA: No pneumothorax. Small/trace bilateral pleural effusions. UPPER ABDOMEN: Partial inclusion nasogastric tube within the stomach. Reflux of contrast into the infrahepatic IVC and hepatic veins. . OTHER: There are multiple rib fractures present:   > On the right, there are moderately displaced anterolateral fractures of the third, fourth, fifth, sixth, seventh, eighth, and ninth ribs.   > On the left there are mildly displaced fractures of the left third, fourth, fifth ribs. Vertebral body heights are normal. No compression fracture. Partial inclusion right breast prosthesis. _______________     1. No evidence of pulmonary embolism. 2. Multifocal areas of alveolar consolidation, greatest across the right upper, middle, and lower lobes as described above, potentially pneumonitis related to aspiration given the distribution. 3. Small bilateral pleural effusions without pneumothorax. 4. Multiple bilateral rib fractures as described in detail above. CT ABD PELV W CONT    Result Date: 12/16/2021  EXAM: CT of the abdomen and pelvis INDICATION: Abdominal pain, cardiac arrest, breast cancer, respiratory failure. COMPARISON: None. TECHNIQUE: Axial CT imaging of the abdomen and pelvis was performed with intravenous contrast. Multiplanar reformats were generated.  One or more dose reduction techniques were used on this CT: automated exposure control, adjustment of the mAs and/or kVp according to patient size, and iterative reconstruction techniques. The specific techniques used on this CT exam have been documented in the patient's electronic medical record. Digital Imaging and Communications in Medicine (DICOM) format image data are available to nonaffiliated external healthcare facilities or entities on a secure, media free, reciprocally searchable basis with patient authorization for at least a 12-month period after this study. _______________ FINDINGS: LOWER CHEST: Please see contemporaneously performed CT angiogram of the chest. LIVER, BILIARY: Significantly heterogeneous enhancement pattern to the liver noted with dilated infrahepatic IVC. No biliary dilation. Cholecystectomy. PANCREAS: Normal. SPLEEN: Normal. ADRENALS: Normal. KIDNEYS/URETERS/BLADDER: No hydronephrosis. Symmetric renal enhancement. Urinary bladder decompressed with Infante catheter in situ. PELVIC ORGANS: Hysterectomy. VASCULATURE: Mild diffuse aortobiiliac atherosclerosis without evidence of aneurysmal dilatation. LYMPH NODES: No enlarged lymph nodes. GASTROINTESTINAL TRACT: Nasogastric tube is appropriately positioned within the stomach. Evaluation of small and large bowel is somewhat limited secondary to respiratory motion artifact. No morphology of bowel obstruction. No free intraperitoneal gas. Normal appendix. BONES: No acute or aggressive osseous abnormalities identified. Lower lumbar spondylosis and facet joint osteoarthritis. OTHER: None. _______________     1. Heterogeneous parenchymal enhancement which may be accentuated by contrast bolus timing. These findings may also be accentuated by low cardiac output state/congestive hepatopathy. 2. Nasogastric tube appropriately positioned within the stomach. No evidence of bowel obstruction or free intraperitoneal gas. 3. No hydronephrosis. Urinary bladder decompressed with Infante catheter in situ.     XR CHEST PORT    Result Date: 12/19/2021  EXAM: CHEST RADIOGRAPH, SINGLE VIEW CLINICAL INDICATION/HISTORY: et tube      <Additional:  None. COMPARISON: 12/18/2021 TECHNIQUE: Portable frontal view of the chest was obtained. _______________ FINDINGS: SUPPORT DEVICES: ETT is 2.3 cm above the jean, nasogastric tube tip is at the expected position of the gastric body, right jugular Mediport catheter tip projects at the upper right atrium. HEART AND MEDIASTINUM: Cardiac silhouette is mildly prominent. LUNGS AND PLEURAL SPACES: Diffuse right lung infiltrate, mid to inferior left lung infiltrate are not significantly changed. No pneumothorax or obvious pleural effusion. BONY THORAX AND SOFT TISSUES: No acute osseous abnormality. _______________     Right greater than left bilateral pneumonia versus asymmetrical pulmonary edema without change. XR CHEST PORT    Result Date: 12/18/2021  EXAM: CHEST RADIOGRAPH, SINGLE VIEW CLINICAL INDICATION/HISTORY: et tube      <Additional:  None. COMPARISON: 12/17/2021 TECHNIQUE: Portable frontal view of the chest was obtained. _______________ FINDINGS: SUPPORT DEVICES: ETT is 2.7 cm above the jean, right jugular approach Mediport catheter tip projects at the upper right atrium, nasogastric tube tip projects at the expected position of the gastric body. HEART AND MEDIASTINUM: Cardiac silhouette is mildly prominent. LUNGS AND PLEURAL SPACES: Interstitial and alveolar markings are present throughout the right lung, and in the left perihilar and basilar lung, slightly improved on the right. . No pneumothorax or pleural effusion. BONY THORAX AND SOFT TISSUES: Surgical clips are present in both axillary regions. _______________     Asymmetrical right greater than left pulmonary edema versus pneumonia, slightly improved on the right, otherwise unchanged.     XR CHEST PORT    Result Date: 12/17/2021  EXAM: XR CHEST PORT CLINICAL INDICATION/HISTORY: et tube -Additional: Cardiac arrest COMPARISON: 12/16/2021 TECHNIQUE: Portable frontal view of the chest _______________ FINDINGS: SUPPORT DEVICES: Endotracheal tube approximately 2 cm above the jean. Nasogastric tube is below the diaphragm, tip collimated from view. Right chest wall Mediport catheter tip projecting over the superior cavoatrial junction. HEART AND MEDIASTINUM: Stable appearing cardiac size and mediastinal contours. LUNGS AND PLEURAL SPACES: Multifocal areas of consolidation, greatest across the right mid and lower lung zones without evidence of pneumothorax. Small bilateral effusions. BONY THORAX AND SOFT TISSUES: Patient's known multiple bilateral (right greater than left) rib fractures seen to better advantage on cross-sectional imaging. _______________     1. Support devices in position as above. 2. Multifocal, asymmetric consolidation; pneumonia/pneumonitis similar to prior. 3. Patient's known multiple bilateral rib fractures seen to better advantage on CT imaging. No evidence of pneumothorax. XR CHEST PORT    Result Date: 12/16/2021  EXAM: XR CHEST PORT CLINICAL INDICATION/HISTORY: meets SIRS criteria -Additional: Cardiac arrest, metastatic breast cancer COMPARISON: Radiographs 10/29/2009 TECHNIQUE: Portable frontal view of the chest _______________ FINDINGS: SUPPORT DEVICES: Endotracheal tube is approximately 2.3 cm above the jean. Nasogastric tube is below the diaphragm, tip collimated from view. Right chest wall Mediport catheter tip projecting at the superior cavoatrial junction. HEART AND MEDIASTINUM: Normal appearing cardiac size and mediastinal contours. LUNGS AND PLEURAL SPACES: There are areas of diffuse bilateral pulmonary opacification, right greater than left. No evidence of pneumothorax or definite pleural effusion. BONY THORAX AND SOFT TISSUES: Bilateral axillary surgical clips. No acute osseous abnormality demonstrated. _______________     1. Support devices in appropriate position. 2. Asymmetric, diffuse areas of pulmonary opacification, potentially edema and-or pneumonia.      ECHO ADULT COMPLETE    Result Date: 12/16/2021    Left Ventricle: Left ventricle is mildly dilated. Normal wall thickness. See diagram for wall motion findings. Severely reduced left ventricular systolic function with a visually estimated EF of 15 - 20%.   Right Ventricle: Not well visualized. Right ventricle is mildly dilated. Moderately reduced systolic function.   Left Atrium: Left atrium is mildly dilated.   Pulmonary Artery : Estimated Pulmonary Systolic Artery is 33 mmhg. Pulmonary hypertension found to be mild.   Patient is ventilated, cannot use IVC diameter to estimate right atrial pressure.  IVC size is normal.                 CC: Janice Brown MD .

## 2022-01-04 NOTE — PROGRESS NOTES
Physician Progress Note      Lebron Lennon  CSN #:                  [id number]  :                       1950  ADMIT DATE:       2021 10:43 AM  DISCH DATE:        2021 1:10 PM  RESPONDING  PROVIDER #:        Elijah Espinal MD          QUERY TEXT:    Patient admitted following cardiac arrest. If possible, please document in progress notes and discharge summary the suspected etiology of cardiac arrest is further specified as: The medical record reflects the following:    Risk Factors: Breast Cancer, Cardiomyopathy, CHF, HTN, parox Afib    Clinical Indicators:  Per Ambulance report: CPR initiated. MAD Incubator Billing MAD Incubator Billing During pulse and rhythm check patient was noted to be in V. fib and was defbrillated at 200j. Patient changed rhythm several times going from PEA to V-Fib. Recieved Epi x4, defibrillated x3  >Pulmonary progress notes stated-\"cardiopulmonary arrest from primary cardiac issues and complications of breast cancer,\"  \"Cardiopulmonary arrest initially in PEA no pulmonary embolus. Multifactorial has metastatic breast  cancer, cardiomyopathy,\" \"Cardiomyopathy: this is poor prognosis,\" \"Dilated cardiomyopathy. \"  > Echo showed an EF of 15-20%. > Patient also has a history of chronic systolic heart failure. Treatment: Epi x4, Defibillated x3 by EMS, intubated in the field and Boneta Desanctis CPR device initiated, Echo Pulmonary consult, ventilatory support, family opted for comfort care    Thank you,  Lori Marshall RN, BSN, [facility], [place]  [phone]  Options provided:  -- Cardiac Arrest likely due dilated cardiomyopathy  -- Cardiac arrest likely due to V. fib  -- Cardiac arrest likely due to (please specify), please specify most likely etiology.   -- Other - I will add my own diagnosis  -- Disagree - Not applicable / Not valid  -- Disagree - Clinically unable to determine / Unknown  -- Refer to Clinical Documentation Reviewer    PROVIDER RESPONSE TEXT:    Provider is clinically unable to determine a response to this query.     Query created by: Aries Marcos on 12/29/2021 1:16 PM      Electronically signed by:  Sil Brandt MD 1/4/2022 2:58 PM